# Patient Record
Sex: FEMALE | Race: WHITE | NOT HISPANIC OR LATINO | Employment: PART TIME | URBAN - METROPOLITAN AREA
[De-identification: names, ages, dates, MRNs, and addresses within clinical notes are randomized per-mention and may not be internally consistent; named-entity substitution may affect disease eponyms.]

---

## 2019-07-05 ENCOUNTER — HOSPITAL ENCOUNTER (EMERGENCY)
Facility: HOSPITAL | Age: 59
Discharge: HOME | End: 2019-07-05
Attending: EMERGENCY MEDICINE | Admitting: EMERGENCY MEDICINE
Payer: COMMERCIAL

## 2019-07-05 ENCOUNTER — APPOINTMENT (EMERGENCY)
Dept: CT IMAGING | Facility: HOSPITAL | Age: 59
End: 2019-07-05
Payer: COMMERCIAL

## 2019-07-05 VITALS
DIASTOLIC BLOOD PRESSURE: 80 MMHG | SYSTOLIC BLOOD PRESSURE: 140 MMHG | OXYGEN SATURATION: 99 % | WEIGHT: 193.56 LBS | HEART RATE: 70 BPM | RESPIRATION RATE: 16 BRPM | HEIGHT: 65 IN | BODY MASS INDEX: 32.25 KG/M2

## 2019-07-05 DIAGNOSIS — R73.9 HYPERGLYCEMIA: ICD-10-CM

## 2019-07-05 DIAGNOSIS — N20.1 URETEROLITHIASIS: Primary | ICD-10-CM

## 2019-07-05 LAB
ALBUMIN SERPL BCP-MCNC: 4.2 G/DL (ref 3.5–5)
ALP SERPL-CCNC: 70 U/L (ref 46–116)
ALT SERPL W P-5'-P-CCNC: 47 U/L (ref 12–78)
ANION GAP SERPL CALCULATED.3IONS-SCNC: 13 MMOL/L (ref 4–13)
AST SERPL W P-5'-P-CCNC: 29 U/L (ref 5–45)
BACTERIA UR QL AUTO: ABNORMAL /HPF
BASOPHILS # BLD AUTO: 0.09 THOUSANDS/ΜL (ref 0–0.1)
BASOPHILS NFR BLD AUTO: 1 % (ref 0–1)
BILIRUB SERPL-MCNC: 0.6 MG/DL (ref 0.2–1)
BILIRUB UR QL STRIP: NEGATIVE
BUN SERPL-MCNC: 19 MG/DL (ref 5–25)
CALCIUM SERPL-MCNC: 9.4 MG/DL (ref 8.3–10.1)
CHLORIDE SERPL-SCNC: 101 MMOL/L (ref 100–108)
CLARITY UR: ABNORMAL
CO2 SERPL-SCNC: 25 MMOL/L (ref 21–32)
COLOR UR: YELLOW
CREAT SERPL-MCNC: 1.03 MG/DL (ref 0.6–1.3)
EOSINOPHIL # BLD AUTO: 0.06 THOUSAND/ΜL (ref 0–0.61)
EOSINOPHIL NFR BLD AUTO: 1 % (ref 0–6)
ERYTHROCYTE [DISTWIDTH] IN BLOOD BY AUTOMATED COUNT: 13.2 % (ref 11.6–15.1)
GFR SERPL CREATININE-BSD FRML MDRD: 60 ML/MIN/1.73SQ M
GLUCOSE SERPL-MCNC: 257 MG/DL (ref 65–140)
GLUCOSE UR STRIP-MCNC: ABNORMAL MG/DL
HCT VFR BLD AUTO: 47.6 % (ref 34.8–46.1)
HGB BLD-MCNC: 15.6 G/DL (ref 11.5–15.4)
HGB UR QL STRIP.AUTO: ABNORMAL
IMM GRANULOCYTES # BLD AUTO: 0.06 THOUSAND/UL (ref 0–0.2)
IMM GRANULOCYTES NFR BLD AUTO: 1 % (ref 0–2)
KETONES UR STRIP-MCNC: ABNORMAL MG/DL
LEUKOCYTE ESTERASE UR QL STRIP: NEGATIVE
LIPASE SERPL-CCNC: 203 U/L (ref 73–393)
LYMPHOCYTES # BLD AUTO: 3.49 THOUSANDS/ΜL (ref 0.6–4.47)
LYMPHOCYTES NFR BLD AUTO: 29 % (ref 14–44)
MCH RBC QN AUTO: 30.5 PG (ref 26.8–34.3)
MCHC RBC AUTO-ENTMCNC: 32.8 G/DL (ref 31.4–37.4)
MCV RBC AUTO: 93 FL (ref 82–98)
MONOCYTES # BLD AUTO: 0.79 THOUSAND/ΜL (ref 0.17–1.22)
MONOCYTES NFR BLD AUTO: 7 % (ref 4–12)
NEUTROPHILS # BLD AUTO: 7.56 THOUSANDS/ΜL (ref 1.85–7.62)
NEUTS SEG NFR BLD AUTO: 61 % (ref 43–75)
NITRITE UR QL STRIP: NEGATIVE
NON-SQ EPI CELLS URNS QL MICRO: ABNORMAL /HPF
NRBC BLD AUTO-RTO: 0 /100 WBCS
PH UR STRIP.AUTO: 5.5 [PH]
PLATELET # BLD AUTO: 305 THOUSANDS/UL (ref 149–390)
PMV BLD AUTO: 10.4 FL (ref 8.9–12.7)
POTASSIUM SERPL-SCNC: 4.2 MMOL/L (ref 3.5–5.3)
PROT SERPL-MCNC: 8 G/DL (ref 6.4–8.2)
PROT UR STRIP-MCNC: ABNORMAL MG/DL
RBC # BLD AUTO: 5.11 MILLION/UL (ref 3.81–5.12)
RBC #/AREA URNS AUTO: ABNORMAL /HPF
SODIUM SERPL-SCNC: 139 MMOL/L (ref 136–145)
SP GR UR STRIP.AUTO: >=1.03 (ref 1–1.03)
UROBILINOGEN UR QL STRIP.AUTO: 0.2 E.U./DL
WBC # BLD AUTO: 12.05 THOUSAND/UL (ref 4.31–10.16)
WBC #/AREA URNS AUTO: ABNORMAL /HPF

## 2019-07-05 PROCEDURE — 74176 CT ABD & PELVIS W/O CONTRAST: CPT

## 2019-07-05 PROCEDURE — 96374 THER/PROPH/DIAG INJ IV PUSH: CPT

## 2019-07-05 PROCEDURE — 99284 EMERGENCY DEPT VISIT MOD MDM: CPT

## 2019-07-05 PROCEDURE — 85025 COMPLETE CBC W/AUTO DIFF WBC: CPT | Performed by: EMERGENCY MEDICINE

## 2019-07-05 PROCEDURE — 83690 ASSAY OF LIPASE: CPT | Performed by: EMERGENCY MEDICINE

## 2019-07-05 PROCEDURE — 80053 COMPREHEN METABOLIC PANEL: CPT | Performed by: EMERGENCY MEDICINE

## 2019-07-05 PROCEDURE — 96375 TX/PRO/DX INJ NEW DRUG ADDON: CPT

## 2019-07-05 PROCEDURE — 36415 COLL VENOUS BLD VENIPUNCTURE: CPT | Performed by: EMERGENCY MEDICINE

## 2019-07-05 PROCEDURE — 96376 TX/PRO/DX INJ SAME DRUG ADON: CPT

## 2019-07-05 PROCEDURE — 81001 URINALYSIS AUTO W/SCOPE: CPT | Performed by: EMERGENCY MEDICINE

## 2019-07-05 PROCEDURE — 99284 EMERGENCY DEPT VISIT MOD MDM: CPT | Performed by: EMERGENCY MEDICINE

## 2019-07-05 PROCEDURE — 96361 HYDRATE IV INFUSION ADD-ON: CPT

## 2019-07-05 RX ORDER — KETOROLAC TROMETHAMINE 30 MG/ML
15 INJECTION, SOLUTION INTRAMUSCULAR; INTRAVENOUS ONCE
Status: COMPLETED | OUTPATIENT
Start: 2019-07-05 | End: 2019-07-05

## 2019-07-05 RX ORDER — HYDROMORPHONE HCL/PF 1 MG/ML
0.5 SYRINGE (ML) INJECTION ONCE
Status: COMPLETED | OUTPATIENT
Start: 2019-07-05 | End: 2019-07-05

## 2019-07-05 RX ORDER — OXYCODONE HYDROCHLORIDE AND ACETAMINOPHEN 5; 325 MG/1; MG/1
1 TABLET ORAL EVERY 4 HOURS PRN
Qty: 15 TABLET | Refills: 0 | Status: SHIPPED | OUTPATIENT
Start: 2019-07-05 | End: 2021-04-03

## 2019-07-05 RX ORDER — ONDANSETRON 4 MG/1
4 TABLET, FILM COATED ORAL EVERY 8 HOURS PRN
Qty: 12 TABLET | Refills: 0 | Status: SHIPPED | OUTPATIENT
Start: 2019-07-05 | End: 2021-04-03

## 2019-07-05 RX ORDER — ONDANSETRON 2 MG/ML
4 INJECTION INTRAMUSCULAR; INTRAVENOUS ONCE
Status: COMPLETED | OUTPATIENT
Start: 2019-07-05 | End: 2019-07-05

## 2019-07-05 RX ORDER — IBUPROFEN 600 MG/1
600 TABLET ORAL EVERY 6 HOURS PRN
Qty: 20 TABLET | Refills: 0 | Status: SHIPPED | OUTPATIENT
Start: 2019-07-05

## 2019-07-05 RX ADMIN — SODIUM CHLORIDE 1000 ML: 0.9 INJECTION, SOLUTION INTRAVENOUS at 09:31

## 2019-07-05 RX ADMIN — KETOROLAC TROMETHAMINE 15 MG: 30 INJECTION, SOLUTION INTRAMUSCULAR at 13:05

## 2019-07-05 RX ADMIN — HYDROMORPHONE HYDROCHLORIDE 0.5 MG: 1 INJECTION, SOLUTION INTRAMUSCULAR; INTRAVENOUS; SUBCUTANEOUS at 09:31

## 2019-07-05 RX ADMIN — HYDROMORPHONE HYDROCHLORIDE 0.5 MG: 1 INJECTION, SOLUTION INTRAMUSCULAR; INTRAVENOUS; SUBCUTANEOUS at 10:36

## 2019-07-05 RX ADMIN — ONDANSETRON 4 MG: 2 INJECTION INTRAMUSCULAR; INTRAVENOUS at 09:31

## 2019-07-05 NOTE — ED PROVIDER NOTES
Pt Name: Sherie Iyer  MRN: [id number]  [place] 1960  Age/Sex: 60 y o  female  Date of evaluation: 7/5/2019  PCP: No primary care provider on file  CHIEF COMPLAINT    Chief Complaint   Patient presents with    Flank Pain     Pt presents with c/o L sided flank pain and burning that started this morning  Denies any V/D and any hx of the same symptoms  HPI    60 y o  female presenting with left-sided flank pain  Patient states that she woke with this pain this morning, denies any prior episodes  The pain is severe, burning, in the left flank, constant, worse with lying flat and better sitting up  She also complains of nausea and episode of nonbloody vomiting which she thinks may have been due to pain, also complains of malodorous urine over the past 2-3 days, denies fever, trauma, changes in bowel movements, other symptoms  HPI      Past Medical and Surgical History    History reviewed  No pertinent past medical history  Past Surgical History:   Procedure Laterality Date    TONSILLECTOMY         History reviewed  No pertinent family history  Social History     Tobacco Use    Smoking status: Never Smoker    Smokeless tobacco: Never Used   Substance Use Topics    Alcohol use: Never     Frequency: Never    Drug use: Never           Allergies    No Known Allergies    Home Medications    Prior to Admission medications    Not on File           Review of Systems    Review of Systems   Constitutional: Negative for activity change, chills and fever  HENT: Negative for drooling and facial swelling  Eyes: Negative for pain, discharge and visual disturbance  Respiratory: Negative for apnea, cough, chest tightness, shortness of breath and wheezing  Cardiovascular: Negative for chest pain and leg swelling  Gastrointestinal: Positive for nausea and vomiting  Negative for abdominal pain, constipation and diarrhea  Genitourinary: Positive for dysuria and flank pain   Negative for difficulty urinating and urgency  Musculoskeletal: Negative for arthralgias, back pain and gait problem  Skin: Negative for color change and rash  Neurological: Negative for dizziness, speech difficulty, weakness and headaches  Psychiatric/Behavioral: Negative for agitation, behavioral problems and confusion  All other systems reviewed and negative  Physical Exam      ED Triage Vitals [07/05/19 0904]   Temp Pulse Respirations Blood Pressure SpO2   -- 73 20 144/77 99 %      Temp src Heart Rate Source Patient Position - Orthostatic VS BP Location FiO2 (%)   -- Monitor Sitting Left arm --      Pain Score       Worst Possible Pain               Physical Exam   Constitutional: She is oriented to person, place, and time  She appears well-developed and well-nourished  HENT:   Head: Normocephalic and atraumatic  Nose: Nose normal    Mouth/Throat: Oropharynx is clear and moist    Eyes: Pupils are equal, round, and reactive to light  Conjunctivae and EOM are normal    Neck: Normal range of motion  Neck supple  Cardiovascular: Normal rate, regular rhythm, normal heart sounds and intact distal pulses  Pulmonary/Chest: Effort normal and breath sounds normal  No respiratory distress  She has no wheezes  She has no rales  Abdominal: Soft  She exhibits no distension  There is no tenderness  There is no rebound and no guarding  Left-sided CVA tenderness, no tenderness to palpation of the abdomen   Musculoskeletal: Normal range of motion  She exhibits no edema or deformity  Neurological: She is alert and oriented to person, place, and time  Skin: Skin is warm and dry  No rash noted  No erythema  No rash or skin findings over site of pain or surrounding areas   Psychiatric: She has a normal mood and affect  Her behavior is normal  Judgment and thought content normal    Nursing note and vitals reviewed             Diagnostic Results      Labs:    Results for orders placed or performed during the hospital encounter of 07/05/19   UA w Reflex to Microscopic w Reflex to Culture   Result Value Ref Range    Color, UA Yellow     Clarity, UA Slightly Cloudy     Specific Gravity, UA >=1 030 1 003 - 1 030    pH, UA 5 5 4 5, 5 0, 5 5, 6 0, 6 5, 7 0, 7 5, 8 0    Leukocytes, UA Negative Negative    Nitrite, UA Negative Negative    Protein, UA 30 (1+) (A) Negative mg/dl    Glucose,  (1/4%) (A) Negative mg/dl    Ketones, UA 40 (2+) (A) Negative mg/dl    Urobilinogen, UA 0 2 0 2, 1 0 E U /dl E U /dl    Bilirubin, UA Negative Negative    Blood, UA Large (A) Negative   CBC and differential   Result Value Ref Range    WBC 12 05 (H) 4 31 - 10 16 Thousand/uL    RBC 5 11 3 81 - 5 12 Million/uL    Hemoglobin 15 6 (H) 11 5 - 15 4 g/dL    Hematocrit 47 6 (H) 34 8 - 46 1 %    MCV 93 82 - 98 fL    MCH 30 5 26 8 - 34 3 pg    MCHC 32 8 31 4 - 37 4 g/dL    RDW 13 2 11 6 - 15 1 %    MPV 10 4 8 9 - 12 7 fL    Platelets 315 412 - 359 Thousands/uL    nRBC 0 /100 WBCs    Neutrophils Relative 61 43 - 75 %    Immat GRANS % 1 0 - 2 %    Lymphocytes Relative 29 14 - 44 %    Monocytes Relative 7 4 - 12 %    Eosinophils Relative 1 0 - 6 %    Basophils Relative 1 0 - 1 %    Neutrophils Absolute 7 56 1 85 - 7 62 Thousands/µL    Immature Grans Absolute 0 06 0 00 - 0 20 Thousand/uL    Lymphocytes Absolute 3 49 0 60 - 4 47 Thousands/µL    Monocytes Absolute 0 79 0 17 - 1 22 Thousand/µL    Eosinophils Absolute 0 06 0 00 - 0 61 Thousand/µL    Basophils Absolute 0 09 0 00 - 0 10 Thousands/µL   Comprehensive metabolic panel   Result Value Ref Range    Sodium 139 136 - 145 mmol/L    Potassium 4 2 3 5 - 5 3 mmol/L    Chloride 101 100 - 108 mmol/L    CO2 25 21 - 32 mmol/L    ANION GAP 13 4 - 13 mmol/L    BUN 19 5 - 25 mg/dL    Creatinine 1 03 0 60 - 1 30 mg/dL    Glucose 257 (H) 65 - 140 mg/dL    Calcium 9 4 8 3 - 10 1 mg/dL    AST 29 5 - 45 U/L    ALT 47 12 - 78 U/L    Alkaline Phosphatase 70 46 - 116 U/L    Total Protein 8 0 6 4 - 8 2 g/dL Albumin 4 2 3 5 - 5 0 g/dL    Total Bilirubin 0 60 0 20 - 1 00 mg/dL    eGFR 60 ml/min/1 73sq m   Lipase   Result Value Ref Range    Lipase 203 73 - 393 u/L   Urine Microscopic   Result Value Ref Range    RBC, UA 30-50 (A) None Seen, 0-5 /hpf    WBC, UA 2-4 (A) None Seen, 0-5, 5-55, 5-65 /hpf    Epithelial Cells Occasional None Seen, Occasional /hpf    Bacteria, UA Occasional None Seen, Occasional /hpf       All labs reviewed and utilized in the medical decision making process    Radiology:    CT renal stone study abdomen pelvis without contrast   Final Result   1   3 mm obstructing calculus in the left UVJ with mild fullness of the left renal pelvis and perinephric stranding  2   Cholelithiasis  3   Hepatic steatosis  Workstation performed: [id number]             All radiology studies independently viewed by me and interpreted by the radiologist     Procedure    Procedures        ED Course of Care and Re-Assessments      Initial workup with concern for pyelonephritis, hematuria but no evidence of infection concerning for ureteral stone, CT performed and showed same  Patient counseled on diagnosis, treated symptomatically, discharged strict return precautions, follow up Urology  Medications   sodium chloride 0 9 % bolus 1,000 mL (0 mL Intravenous Stopped 7/5/19 1031)   ondansetron (ZOFRAN) injection 4 mg (4 mg Intravenous Given 7/5/19 0931)   HYDROmorphone (DILAUDID) injection 0 5 mg (0 5 mg Intravenous Given 7/5/19 0931)   HYDROmorphone (DILAUDID) injection 0 5 mg (0 5 mg Intravenous Given 7/5/19 1036)   ketorolac (TORADOL) injection 15 mg (15 mg Intravenous Given 7/5/19 1305)           FINAL IMPRESSION    Final diagnoses:   Ureterolithiasis   Hyperglycemia         DISPOSITION/PLAN    Flank pain most consistent with ureterolithiasis above  Vital signs reassuring, examination concerning for flank pain and acute distress above    Low suspicion for infected stone, sepsis, other acute intra-abdominal process, other alternate diagnosis  Treated symptomatically with substantial improvement, discharged strict return precautions, follow up primary care doctor and Urology  Time reflects when diagnosis was documented in both MDM as applicable and the Disposition within this note     Time User Action Codes Description Comment    7/5/2019 12:58 PM Akbar Neely Add [N20 1] Ureterolithiasis     7/5/2019  1:00 PM Ashly WOODWARD Add [R73 9] Hyperglycemia       ED Disposition     ED Disposition Condition Date/Time Comment    Discharge Stable Fri Jul 5, 2019 12:58 PM Mcadams Pica discharge to home/self care              Follow-up Information     Follow up With Specialties Details Why Contact Info Additional [address] [facility] Urology Kev [facility] Urology Call today To schedule close outpatient followup [address]  [facility] [address] [phone] [facility] Kev Perkins, [address],  [address], [place], [place], [address]    Your primary care doctor  Call today To discuss this visit              PATIENT REFERRED TO:    [facility] Kev Perkins  [address]  [address]  [address]  [phone]  Call today  To schedule close outpatient followup    Your primary care doctor    Call today  To discuss this visit      DISCHARGE MEDICATIONS:    Discharge Medication List as of 7/5/2019  1:00 PM      START taking these medications    Details   ibuprofen (MOTRIN) 600 mg tablet Take 1 tablet (600 mg total) by mouth every 6 (six) hours as needed for moderate pain, Starting Fri 7/5/2019, Print      ondansetron (ZOFRAN) 4 mg tablet Take 1 tablet (4 mg total) by mouth every 8 (eight) hours as needed for nausea or vomiting, Starting Fri 7/5/2019, Print      oxyCODONE-acetaminophen (PERCOCET) 5-325 mg per tablet Take 1 tablet by mouth every 4 (four) hours as needed for moderate pain for up to 15 dosesMax Daily Amount: 6 tablets, Starting Fri 7/5/2019, Print             No discharge procedures on file           MD Altagracia Waller MD  07/05/19 3798

## 2021-03-29 ENCOUNTER — HOSPITAL ENCOUNTER (EMERGENCY)
Facility: HOSPITAL | Age: 61
Discharge: HOME | End: 2021-03-29
Attending: EMERGENCY MEDICINE | Admitting: EMERGENCY MEDICINE
Payer: COMMERCIAL

## 2021-03-29 ENCOUNTER — APPOINTMENT (EMERGENCY)
Dept: CT IMAGING | Facility: HOSPITAL | Age: 61
End: 2021-03-29
Payer: COMMERCIAL

## 2021-03-29 ENCOUNTER — APPOINTMENT (EMERGENCY)
Dept: RADIOLOGY | Facility: HOSPITAL | Age: 61
End: 2021-03-29
Payer: COMMERCIAL

## 2021-03-29 VITALS
BODY MASS INDEX: 29.82 KG/M2 | OXYGEN SATURATION: 94 % | HEART RATE: 79 BPM | WEIGHT: 179 LBS | SYSTOLIC BLOOD PRESSURE: 127 MMHG | HEIGHT: 65 IN | RESPIRATION RATE: 19 BRPM | DIASTOLIC BLOOD PRESSURE: 60 MMHG | TEMPERATURE: 100.4 F

## 2021-03-29 DIAGNOSIS — N39.0 UTI (URINARY TRACT INFECTION): ICD-10-CM

## 2021-03-29 DIAGNOSIS — U07.1 PNEUMONIA DUE TO COVID-19 VIRUS: Primary | ICD-10-CM

## 2021-03-29 DIAGNOSIS — J12.82 PNEUMONIA DUE TO COVID-19 VIRUS: Primary | ICD-10-CM

## 2021-03-29 LAB
ALBUMIN SERPL BCP-MCNC: 3.3 G/DL (ref 3.5–5)
ALP SERPL-CCNC: 63 U/L (ref 46–116)
ALT SERPL W P-5'-P-CCNC: 57 U/L (ref 12–78)
ANION GAP SERPL CALCULATED.3IONS-SCNC: 13 MMOL/L (ref 4–13)
AST SERPL W P-5'-P-CCNC: 46 U/L (ref 5–45)
BACTERIA UR QL AUTO: ABNORMAL /HPF
BASOPHILS # BLD AUTO: 0.01 THOUSANDS/ΜL (ref 0–0.1)
BASOPHILS NFR BLD AUTO: 0 % (ref 0–1)
BILIRUB SERPL-MCNC: 0.38 MG/DL (ref 0.2–1)
BILIRUB UR QL STRIP: ABNORMAL
BUN SERPL-MCNC: 14 MG/DL (ref 5–25)
CALCIUM ALBUM COR SERPL-MCNC: 8.9 MG/DL (ref 8.3–10.1)
CALCIUM SERPL-MCNC: 8.3 MG/DL (ref 8.3–10.1)
CHLORIDE SERPL-SCNC: 101 MMOL/L (ref 100–108)
CLARITY UR: CLEAR
CO2 SERPL-SCNC: 24 MMOL/L (ref 21–32)
COLOR UR: YELLOW
CREAT SERPL-MCNC: 0.86 MG/DL (ref 0.6–1.3)
EOSINOPHIL # BLD AUTO: 0 THOUSAND/ΜL (ref 0–0.61)
EOSINOPHIL NFR BLD AUTO: 0 % (ref 0–6)
ERYTHROCYTE [DISTWIDTH] IN BLOOD BY AUTOMATED COUNT: 13.1 % (ref 11.6–15.1)
GFR SERPL CREATININE-BSD FRML MDRD: 73 ML/MIN/1.73SQ M
GLUCOSE SERPL-MCNC: 242 MG/DL (ref 65–140)
GLUCOSE UR STRIP-MCNC: ABNORMAL MG/DL
HCT VFR BLD AUTO: 49.5 % (ref 34.8–46.1)
HGB BLD-MCNC: 16.2 G/DL (ref 11.5–15.4)
HGB UR QL STRIP.AUTO: ABNORMAL
IMM GRANULOCYTES # BLD AUTO: 0.03 THOUSAND/UL (ref 0–0.2)
IMM GRANULOCYTES NFR BLD AUTO: 1 % (ref 0–2)
KETONES UR STRIP-MCNC: ABNORMAL MG/DL
LEUKOCYTE ESTERASE UR QL STRIP: NEGATIVE
LIPASE SERPL-CCNC: 292 U/L (ref 73–393)
LYMPHOCYTES # BLD AUTO: 1.69 THOUSANDS/ΜL (ref 0.6–4.47)
LYMPHOCYTES NFR BLD AUTO: 30 % (ref 14–44)
MCH RBC QN AUTO: 30.3 PG (ref 26.8–34.3)
MCHC RBC AUTO-ENTMCNC: 32.7 G/DL (ref 31.4–37.4)
MCV RBC AUTO: 93 FL (ref 82–98)
MONOCYTES # BLD AUTO: 0.65 THOUSAND/ΜL (ref 0.17–1.22)
MONOCYTES NFR BLD AUTO: 12 % (ref 4–12)
MUCOUS THREADS UR QL AUTO: ABNORMAL
NEUTROPHILS # BLD AUTO: 3.22 THOUSANDS/ΜL (ref 1.85–7.62)
NEUTS SEG NFR BLD AUTO: 57 % (ref 43–75)
NITRITE UR QL STRIP: NEGATIVE
NON-SQ EPI CELLS URNS QL MICRO: ABNORMAL /HPF
NRBC BLD AUTO-RTO: 0 /100 WBCS
PH UR STRIP.AUTO: 6 [PH]
PLATELET # BLD AUTO: 147 THOUSANDS/UL (ref 149–390)
PMV BLD AUTO: 11 FL (ref 8.9–12.7)
POTASSIUM SERPL-SCNC: 3.8 MMOL/L (ref 3.5–5.3)
PROT SERPL-MCNC: 7.6 G/DL (ref 6.4–8.2)
PROT UR STRIP-MCNC: ABNORMAL MG/DL
RBC # BLD AUTO: 5.34 MILLION/UL (ref 3.81–5.12)
RBC #/AREA URNS AUTO: ABNORMAL /HPF
SARS-COV-2 RNA RESP QL NAA+PROBE: POSITIVE
SODIUM SERPL-SCNC: 138 MMOL/L (ref 136–145)
SP GR UR STRIP.AUTO: >=1.03 (ref 1–1.03)
UROBILINOGEN UR QL STRIP.AUTO: 0.2 E.U./DL
WBC # BLD AUTO: 5.6 THOUSAND/UL (ref 4.31–10.16)
WBC #/AREA URNS AUTO: ABNORMAL /HPF

## 2021-03-29 PROCEDURE — 87635 SARS-COV-2 COVID-19 AMP PRB: CPT | Performed by: EMERGENCY MEDICINE

## 2021-03-29 PROCEDURE — 71046 X-RAY EXAM CHEST 2 VIEWS: CPT

## 2021-03-29 PROCEDURE — 74176 CT ABD & PELVIS W/O CONTRAST: CPT

## 2021-03-29 PROCEDURE — 99284 EMERGENCY DEPT VISIT MOD MDM: CPT | Performed by: EMERGENCY MEDICINE

## 2021-03-29 PROCEDURE — 81001 URINALYSIS AUTO W/SCOPE: CPT | Performed by: EMERGENCY MEDICINE

## 2021-03-29 PROCEDURE — 80053 COMPREHEN METABOLIC PANEL: CPT | Performed by: EMERGENCY MEDICINE

## 2021-03-29 PROCEDURE — 36415 COLL VENOUS BLD VENIPUNCTURE: CPT

## 2021-03-29 PROCEDURE — 99284 EMERGENCY DEPT VISIT MOD MDM: CPT

## 2021-03-29 PROCEDURE — 83690 ASSAY OF LIPASE: CPT | Performed by: EMERGENCY MEDICINE

## 2021-03-29 PROCEDURE — 96372 THER/PROPH/DIAG INJ SC/IM: CPT

## 2021-03-29 PROCEDURE — G1004 CDSM NDSC: HCPCS

## 2021-03-29 PROCEDURE — 85025 COMPLETE CBC W/AUTO DIFF WBC: CPT | Performed by: EMERGENCY MEDICINE

## 2021-03-29 RX ORDER — ONDANSETRON 4 MG/1
4 TABLET, ORALLY DISINTEGRATING ORAL ONCE
Status: COMPLETED | OUTPATIENT
Start: 2021-03-29 | End: 2021-03-29

## 2021-03-29 RX ORDER — ONDANSETRON 4 MG/1
4 TABLET, FILM COATED ORAL EVERY 6 HOURS
Qty: 12 TABLET | Refills: 0 | Status: SHIPPED | OUTPATIENT
Start: 2021-03-29

## 2021-03-29 RX ORDER — CEPHALEXIN 250 MG/1
500 CAPSULE ORAL ONCE
Status: COMPLETED | OUTPATIENT
Start: 2021-03-29 | End: 2021-03-29

## 2021-03-29 RX ORDER — FOLIC ACID/MULTIVIT,IRON,MINER .4-18-35
1 TABLET,CHEWABLE ORAL DAILY
Qty: 7 TABLET | Refills: 0 | Status: SHIPPED | OUTPATIENT
Start: 2021-03-29 | End: 2021-04-05

## 2021-03-29 RX ORDER — MULTIVIT WITH MINERALS/LUTEIN
1000 TABLET ORAL 2 TIMES DAILY
Qty: 14 TABLET | Refills: 0 | Status: SHIPPED | OUTPATIENT
Start: 2021-03-29 | End: 2021-04-05

## 2021-03-29 RX ORDER — KETOROLAC TROMETHAMINE 30 MG/ML
30 INJECTION, SOLUTION INTRAMUSCULAR; INTRAVENOUS ONCE
Status: COMPLETED | OUTPATIENT
Start: 2021-03-29 | End: 2021-03-29

## 2021-03-29 RX ORDER — CEPHALEXIN 250 MG/1
500 CAPSULE ORAL EVERY 6 HOURS SCHEDULED
Qty: 56 CAPSULE | Refills: 0 | Status: SHIPPED | OUTPATIENT
Start: 2021-03-29 | End: 2021-04-12 | Stop reason: HOSPADM

## 2021-03-29 RX ORDER — MELATONIN
2000 DAILY
Qty: 14 TABLET | Refills: 0 | Status: SHIPPED | OUTPATIENT
Start: 2021-03-29 | End: 2021-04-05

## 2021-03-29 RX ADMIN — KETOROLAC TROMETHAMINE 30 MG: 30 INJECTION, SOLUTION INTRAMUSCULAR at 16:00

## 2021-03-29 RX ADMIN — CEPHALEXIN 500 MG: 250 CAPSULE ORAL at 17:39

## 2021-03-29 RX ADMIN — ONDANSETRON 4 MG: 4 TABLET, ORALLY DISINTEGRATING ORAL at 16:00

## 2021-03-29 NOTE — ED PROVIDER NOTES
Pt Name: Mulu Thibodeaux  MRN: [id number]  [place] 1960  Age/Sex: 63 y o  female  Date of evaluation: 3/29/2021  PCP: No primary care provider on file  CHIEF COMPLAINT    Chief Complaint   Patient presents with    Vomiting      COVID positive, pt tested a few days ago and was told she was negative  Pt reports fever, cough, diarrhea and vomiting x1 week   Fever - 9 weeks to 76 years         HPI    63 y o  female presenting with 1 week of fever, cough, diarrhea, nausea, occasional vomiting  Patient states the symptoms been going on over the past week, growing gradually worse  She is still able to keep down fluids but has had poor appetite overall  She states her  is COVID positive and has similar symptoms, she had test several days ago which was negative but she is concern for a false negative  She also complains of several days of hematuria and dysuria  She notes that she has a history of kidney stones, is not sure if this is a recurrent kidney stone  She denies chest pain, trauma, other symptoms  HPI      Past Medical and Surgical History    History reviewed  No pertinent past medical history  Past Surgical History:   Procedure Laterality Date    TONSILLECTOMY         History reviewed  No pertinent family history  Social History     Tobacco Use    Smoking status: Never Smoker    Smokeless tobacco: Never Used   Substance Use Topics    Alcohol use: Never     Frequency: Never    Drug use: Never           Allergies    No Known Allergies    Home Medications    Prior to Admission medications    Medication Sig Start Date End Date Taking?  Authorizing Provider   ibuprofen (MOTRIN) 600 mg tablet Take 1 tablet (600 mg total) by mouth every 6 (six) hours as needed for moderate pain 7/5/19   Abimael Grimes MD   ondansetron [facility]) 4 mg tablet Take 1 tablet (4 mg total) by mouth every 8 (eight) hours as needed for nausea or vomiting 7/5/19   Abimael Grimes MD oxyCODONE-acetaminophen (PERCOCET) 5-325 mg per tablet Take 1 tablet by mouth every 4 (four) hours as needed for moderate pain for up to 15 dosesMax Daily Amount: 6 tablets 7/5/19   Niru Leyva MD           Review of Systems    Review of Systems   Constitutional: Positive for fever  Negative for activity change and chills  HENT: Negative for drooling and facial swelling  Eyes: Negative for pain, discharge and visual disturbance  Respiratory: Positive for cough  Negative for apnea, chest tightness, shortness of breath and wheezing  Cardiovascular: Negative for chest pain and leg swelling  Gastrointestinal: Positive for diarrhea, nausea and vomiting  Negative for abdominal pain and constipation  Genitourinary: Positive for dysuria and hematuria  Negative for difficulty urinating and urgency  Musculoskeletal: Negative for arthralgias, back pain and gait problem  Skin: Negative for color change and rash  Neurological: Negative for dizziness, speech difficulty, weakness and headaches  Psychiatric/Behavioral: Negative for agitation, behavioral problems and confusion  All other systems reviewed and negative  Physical Exam      ED Triage Vitals [03/29/21 1134]   Temperature Pulse Respirations Blood Pressure SpO2   100 4 °F (38 °C) 83 18 138/63 93 %      Temp Source Heart Rate Source Patient Position - Orthostatic VS BP Location FiO2 (%)   Oral Monitor Sitting Left arm --      Pain Score       Worst Possible Pain               Physical Exam  Vitals signs and nursing note reviewed  Constitutional:       General: She is not in acute distress  Appearance: Normal appearance  She is well-developed  She is not ill-appearing  HENT:      Head: Normocephalic and atraumatic  Eyes:      Conjunctiva/sclera: Conjunctivae normal       Pupils: Pupils are equal, round, and reactive to light  Neck:      Musculoskeletal: Normal range of motion and neck supple     Cardiovascular: Rate and Rhythm: Normal rate and regular rhythm  Heart sounds: Normal heart sounds  Pulmonary:      Effort: Pulmonary effort is normal  No respiratory distress  Breath sounds: Normal breath sounds  No wheezing or rales  Abdominal:      General: There is no distension  Palpations: Abdomen is soft  Tenderness: There is no abdominal tenderness  There is no guarding or rebound  Musculoskeletal: Normal range of motion  General: No deformity  Skin:     General: Skin is warm and dry  Findings: No erythema or rash  Neurological:      Mental Status: She is alert and oriented to person, place, and time  Psychiatric:         Behavior: Behavior normal          Thought Content: Thought content normal          Judgment: Judgment normal               Diagnostic Results      Labs:    Results Reviewed     Procedure Component Value Units Date/Time    Urine Microscopic [[id number]]  (Abnormal) Collected: 03/29/21 1706    Lab Status: Final result Specimen: Urine, Clean Catch Updated: 03/29/21 1727     RBC, UA 2-4 /hpf      WBC, UA 2-4 /hpf      Epithelial Cells Moderate /hpf      Bacteria, UA Moderate /hpf      MUCUS THREADS Occasional    UA w Reflex to Microscopic w Reflex to Culture [[id number]]  (Abnormal) Collected: 03/29/21 1706    Lab Status: Final result Specimen: Urine, Clean Catch Updated: 03/29/21 1713     Color, UA Yellow     Clarity, UA Clear     Specific Gravity, UA >=1 030     pH, UA 6 0     Leukocytes, UA Negative     Nitrite, UA Negative     Protein, UA 30 (1+) mg/dl      Glucose,  (1/4%) mg/dl      Ketones, UA 40 (2+) mg/dl      Urobilinogen, UA 0 2 E U /dl      Bilirubin, UA Small     Blood, UA Moderate    Novel Coronavirus Jami Wang [facility] [[id number]] Collected: 03/29/21 1600    Lab Status:  In process Specimen: Nares from Nasopharyngeal Swab Updated: 03/29/21 1605    CBC and differential [[id number]]  (Abnormal) Collected: 03/29/21 1215    Lab Status: Final result Specimen: Blood from Arm, Left Updated: 03/29/21 1239     WBC 5 60 Thousand/uL      RBC 5 34 Million/uL      Hemoglobin 16 2 g/dL      Hematocrit 49 5 %      MCV 93 fL      MCH 30 3 pg      MCHC 32 7 g/dL      RDW 13 1 %      MPV 11 0 fL      Platelets 702 Thousands/uL      nRBC 0 /100 WBCs      Neutrophils Relative 57 %      Immat GRANS % 1 %      Lymphocytes Relative 30 %      Monocytes Relative 12 %      Eosinophils Relative 0 %      Basophils Relative 0 %      Neutrophils Absolute 3 22 Thousands/µL      Immature Grans Absolute 0 03 Thousand/uL      Lymphocytes Absolute 1 69 Thousands/µL      Monocytes Absolute 0 65 Thousand/µL      Eosinophils Absolute 0 00 Thousand/µL      Basophils Absolute 0 01 Thousands/µL     Comprehensive metabolic panel [[id number]]  (Abnormal) Collected: 03/29/21 1215    Lab Status: Final result Specimen: Blood from Arm, Left Updated: 03/29/21 1236     Sodium 138 mmol/L      Potassium 3 8 mmol/L      Chloride 101 mmol/L      CO2 24 mmol/L      ANION GAP 13 mmol/L      BUN 14 mg/dL      Creatinine 0 86 mg/dL      Glucose 242 mg/dL      Calcium 8 3 mg/dL      Corrected Calcium 8 9 mg/dL      AST 46 U/L      ALT 57 U/L      Alkaline Phosphatase 63 U/L      Total Protein 7 6 g/dL      Albumin 3 3 g/dL      Total Bilirubin 0 38 mg/dL      eGFR 73 ml/min/1 73sq m     Narrative:      [place] guidelines for Chronic Kidney Disease (CKD):     Stage 1 with normal or high GFR (GFR > 90 mL/min/1 73 square meters)    Stage 2 Mild CKD (GFR = 60-89 mL/min/1 73 square meters)    Stage 3A Moderate CKD (GFR = 45-59 mL/min/1 73 square meters)    Stage 3B Moderate CKD (GFR = 30-44 mL/min/1 73 square meters)    Stage 4 Severe CKD (GFR = 15-29 mL/min/1 73 square meters)    Stage 5 End Stage CKD (GFR <15 mL/min/1 73 square meters)  Note: GFR calculation is accurate only with a steady state creatinine    Lipase [[id number]]  (Normal) Collected: 03/29/21 1215    Lab Status: Final result Specimen: Blood from Arm, Left Updated: 03/29/21 1236     Lipase 292 u/L           All labs reviewed and utilized in the medical decision making process    Radiology:    CT renal stone study abdomen pelvis wo contrast   Final Result      No evidence for obstructive uropathy  Cholelithiasis without CT evidence for acute cholecystitis  Partially imaged groundglass opacities within the visualized lung fields correlated with recent chest x-ray  Workstation performed: [id number]         XR chest 2 views   Final Result      Peripheral, rounded groundglass opacity in the right lung and possible left lower lobe  In the setting of clinically suspected/proven COVID-19, this plain film appearance while nonspecific, can be seen in cases of viral pneumonia such as COVID-19  The study was marked in [facility] for immediate notification                              Workstation performed: [id number]             All radiology studies independently viewed by me and interpreted by the radiologist     Procedure    Procedures        ED Course of Care and Re-Assessments      Symptoms much improved with treatment as below  Patient ambulated with pulse ox not dropping below 96 % at any point with ambulation  Medications   ketorolac (TORADOL) injection 30 mg (30 mg Intramuscular Given 3/29/21 1600)   ondansetron (ZOFRAN-ODT) dispersible tablet 4 mg (4 mg Oral Given 3/29/21 1600)   cephalexin (KEFLEX) capsule 500 mg (500 mg Oral Given 3/29/21 1739)           FINAL IMPRESSION    Final diagnoses:   Pneumonia due to COVID-19 virus   UTI (urinary tract infection)         DISPOSITION/PLAN    Presentation as above most consistent with pneumonia due to COVID-19 virus as well as concurrent urinary tract infection  Vital signs reassuring, examination nonspecific  Symptoms consistent with COVID-19 infection but patient doing well with no significant hypoxemia noted    Laboratory workup yielded abnormalities as above, consistent with dehydration as well as urinary tract infection but no critical metabolic derangements noted  After symptomatic treatment, improved in ER, able to tolerate oral fluids without difficulty  Started on Keflex for urinary tract infection  Discharged strict return precautions, follow up primary care doctor  Also counseled to return for any worsening symptoms particularly shortness of breath or dropping pulse ox  Time reflects when diagnosis was documented in both MDM as applicable and the Disposition within this note     Time User Action Codes Description Comment    3/29/2021  5:37 PM Alejandro Castro Add [U07 1,  J12 82] Pneumonia due to COVID-19 virus     3/29/2021  5:37 PM Raul WOODWARD Add [N39 0] UTI (urinary tract infection)       ED Disposition     ED Disposition Condition Date/Time Comment    Discharge Stable Mon Mar 29, 2021  5:37 PM Orin Bosworth discharge to home/self care              Follow-up Information     Follow up With Specialties Details Why Contact Info Additional [address] [facility] Emergency Department Emergency Medicine Go to  If symptoms worsen [facility] [address] [place] [address] [address] [facility] Emergency Department, [address] [place], [place], [address]    Your primary care doctor   Discuss this visit and schedule close outpatient follow-up      [facility] Urology Call in 1 day To schedule follow-up for your hematuria [address]  [address] [address]  701  [facility] For Urology [facility], [address] [facility]  [address] [facility], [facility] [address], [place], [place], [address]            PATIENT REFERRED TO:    [facility] Emergency Department  [address] [place] [address] [phone]  Go to   If symptoms worsen    Your primary care doctor      Discuss this visit and schedule close outpatient follow-up    [facility] Urology [address]  [address] [facility]  [phone]  Call in 1 day  To schedule follow-up for your hematuria      DISCHARGE MEDICATIONS:    Patient's Medications   Discharge Prescriptions    ASCORBIC ACID (VITAMIN C) 1000 MG TABLET    Take 1 tablet (1,000 mg total) by mouth 2 (two) times a day for 7 days       Start Date: 3/29/2021 End Date: 4/5/2021       Order Dose: 1,000 mg       Quantity: 14 tablet    Refills: 0    CEPHALEXIN (KEFLEX) 250 MG CAPSULE    Take 2 capsules (500 mg total) by mouth every 6 (six) hours for 7 days       Start Date: 3/29/2021 End Date: 4/5/2021       Order Dose: 500 mg       Quantity: 56 capsule    Refills: 0    CHOLECALCIFEROL (VITAMIN D3) 1,000 UNITS TABLET    Take 2 tablets (2,000 Units total) by mouth daily for 7 days       Start Date: 3/29/2021 End Date: 4/5/2021       Order Dose: 2,000 Units       Quantity: 14 tablet    Refills: 0    MISC  DEVICES (PULSE OXIMETER) MISC    Please check your oxygen saturation every 4 hours while awake or if you experience shortness of breath       Start Date: 3/29/2021 End Date: --       Order Dose: --       Quantity: 1 each    Refills: 0    MULTIVITAMIN-IRON-MINERALS-FOLIC ACID (CENTRUM) CHEWABLE TABLET    Chew 1 tablet daily for 7 days       Start Date: 3/29/2021 End Date: 4/5/2021       Order Dose: 1 tablet       Quantity: 7 tablet    Refills: 0    ONDANSETRON (ZOFRAN) 4 MG TABLET    Take 1 tablet (4 mg total) by mouth every 6 (six) hours       Start Date: 3/29/2021 End Date: --       Order Dose: 4 mg       Quantity: 12 tablet    Refills: 0       No discharge procedures on file           MD Dane Kwok MD  03/29/21 4521       Dane Martin MD  03/29/21 0284

## 2021-04-03 ENCOUNTER — APPOINTMENT (EMERGENCY)
Dept: RADIOLOGY | Facility: HOSPITAL | Age: 61
DRG: 871 | End: 2021-04-03
Payer: COMMERCIAL

## 2021-04-03 ENCOUNTER — HOSPITAL ENCOUNTER (INPATIENT)
Facility: HOSPITAL | Age: 61
LOS: 9 days | Discharge: HOME | DRG: 871 | End: 2021-04-12
Attending: EMERGENCY MEDICINE | Admitting: INTERNAL MEDICINE
Payer: COMMERCIAL

## 2021-04-03 DIAGNOSIS — U07.1 PNEUMONIA DUE TO COVID-19 VIRUS: ICD-10-CM

## 2021-04-03 DIAGNOSIS — J12.82 PNEUMONIA DUE TO COVID-19 VIRUS: ICD-10-CM

## 2021-04-03 DIAGNOSIS — R09.02 HYPOXIA: ICD-10-CM

## 2021-04-03 DIAGNOSIS — U07.1 COVID-19 VIRUS INFECTION: ICD-10-CM

## 2021-04-03 DIAGNOSIS — J96.01 ACUTE RESPIRATORY FAILURE WITH HYPOXIA (HCC): ICD-10-CM

## 2021-04-03 DIAGNOSIS — R53.1 WEAKNESS: Primary | ICD-10-CM

## 2021-04-03 PROBLEM — A41.9 SEPSIS (HCC): Status: ACTIVE | Noted: 2021-04-03

## 2021-04-03 PROBLEM — J96.00 ACUTE RESPIRATORY FAILURE (HCC): Status: ACTIVE | Noted: 2021-04-03

## 2021-04-03 PROBLEM — R10.9 ABDOMINAL PAIN: Status: ACTIVE | Noted: 2021-04-03

## 2021-04-03 LAB
ABO GROUP BLD: NORMAL
ALBUMIN SERPL BCP-MCNC: 2.7 G/DL (ref 3.5–5)
ALP SERPL-CCNC: 63 U/L (ref 46–116)
ALT SERPL W P-5'-P-CCNC: 30 U/L (ref 12–78)
ANION GAP SERPL CALCULATED.3IONS-SCNC: 12 MMOL/L (ref 4–13)
AST SERPL W P-5'-P-CCNC: 29 U/L (ref 5–45)
BASOPHILS # BLD MANUAL: 0 THOUSAND/UL (ref 0–0.1)
BASOPHILS NFR MAR MANUAL: 0 % (ref 0–1)
BILIRUB DIRECT SERPL-MCNC: 0.13 MG/DL (ref 0–0.2)
BILIRUB SERPL-MCNC: 0.39 MG/DL (ref 0.2–1)
BUN SERPL-MCNC: 9 MG/DL (ref 5–25)
CALCIUM SERPL-MCNC: 8.8 MG/DL (ref 8.3–10.1)
CHLORIDE SERPL-SCNC: 100 MMOL/L (ref 100–108)
CO2 SERPL-SCNC: 26 MMOL/L (ref 21–32)
CREAT SERPL-MCNC: 0.86 MG/DL (ref 0.6–1.3)
D DIMER PPP FEU-MCNC: 0.85 UG/ML FEU
EOSINOPHIL # BLD MANUAL: 0 THOUSAND/UL (ref 0–0.4)
EOSINOPHIL NFR BLD MANUAL: 0 % (ref 0–6)
ERYTHROCYTE [DISTWIDTH] IN BLOOD BY AUTOMATED COUNT: 12.7 % (ref 11.6–15.1)
GFR SERPL CREATININE-BSD FRML MDRD: 73 ML/MIN/1.73SQ M
GLUCOSE SERPL-MCNC: 247 MG/DL (ref 65–140)
GLUCOSE SERPL-MCNC: 274 MG/DL (ref 65–140)
HCT VFR BLD AUTO: 47 % (ref 34.8–46.1)
HGB BLD-MCNC: 15.7 G/DL (ref 11.5–15.4)
LACTATE SERPL-SCNC: 1.8 MMOL/L (ref 0.5–2)
LYMPHOCYTES # BLD AUTO: 1.62 THOUSAND/UL (ref 0.6–4.47)
LYMPHOCYTES # BLD AUTO: 15 % (ref 14–44)
MCH RBC QN AUTO: 29.8 PG (ref 26.8–34.3)
MCHC RBC AUTO-ENTMCNC: 33.4 G/DL (ref 31.4–37.4)
MCV RBC AUTO: 89 FL (ref 82–98)
MONOCYTES # BLD AUTO: 0.22 THOUSAND/UL (ref 0–1.22)
MONOCYTES NFR BLD: 2 % (ref 4–12)
NEUTROPHILS # BLD MANUAL: 8.85 THOUSAND/UL (ref 1.85–7.62)
NEUTS BAND NFR BLD MANUAL: 4 % (ref 0–8)
NEUTS SEG NFR BLD AUTO: 78 % (ref 43–75)
NRBC BLD AUTO-RTO: 0 /100 WBCS
PLATELET # BLD AUTO: 271 THOUSANDS/UL (ref 149–390)
PLATELET BLD QL SMEAR: ADEQUATE
PMV BLD AUTO: 10.4 FL (ref 8.9–12.7)
POTASSIUM SERPL-SCNC: 3.2 MMOL/L (ref 3.5–5.3)
PROCALCITONIN SERPL-MCNC: 2.56 NG/ML
PROT SERPL-MCNC: 7.1 G/DL (ref 6.4–8.2)
RBC # BLD AUTO: 5.26 MILLION/UL (ref 3.81–5.12)
RH BLD: POSITIVE
SODIUM SERPL-SCNC: 138 MMOL/L (ref 136–145)
TOTAL CELLS COUNTED SPEC: 100
TROPONIN I SERPL-MCNC: <0.02 NG/ML
VARIANT LYMPHS # BLD AUTO: 1 %
WBC # BLD AUTO: 10.79 THOUSAND/UL (ref 4.31–10.16)

## 2021-04-03 PROCEDURE — 36415 COLL VENOUS BLD VENIPUNCTURE: CPT | Performed by: PHYSICIAN ASSISTANT

## 2021-04-03 PROCEDURE — 83605 ASSAY OF LACTIC ACID: CPT | Performed by: PHYSICIAN ASSISTANT

## 2021-04-03 PROCEDURE — 85027 COMPLETE CBC AUTOMATED: CPT | Performed by: PHYSICIAN ASSISTANT

## 2021-04-03 PROCEDURE — 82948 REAGENT STRIP/BLOOD GLUCOSE: CPT

## 2021-04-03 PROCEDURE — 85379 FIBRIN DEGRADATION QUANT: CPT | Performed by: PHYSICIAN ASSISTANT

## 2021-04-03 PROCEDURE — 87493 C DIFF AMPLIFIED PROBE: CPT | Performed by: INTERNAL MEDICINE

## 2021-04-03 PROCEDURE — 84145 PROCALCITONIN (PCT): CPT | Performed by: PHYSICIAN ASSISTANT

## 2021-04-03 PROCEDURE — 84484 ASSAY OF TROPONIN QUANT: CPT | Performed by: PHYSICIAN ASSISTANT

## 2021-04-03 PROCEDURE — 99282 EMERGENCY DEPT VISIT SF MDM: CPT | Performed by: INTERNAL MEDICINE

## 2021-04-03 PROCEDURE — 99223 1ST HOSP IP/OBS HIGH 75: CPT | Performed by: INTERNAL MEDICINE

## 2021-04-03 PROCEDURE — 85007 BL SMEAR W/DIFF WBC COUNT: CPT | Performed by: PHYSICIAN ASSISTANT

## 2021-04-03 PROCEDURE — 80076 HEPATIC FUNCTION PANEL: CPT | Performed by: PHYSICIAN ASSISTANT

## 2021-04-03 PROCEDURE — 99285 EMERGENCY DEPT VISIT HI MDM: CPT | Performed by: PHYSICIAN ASSISTANT

## 2021-04-03 PROCEDURE — 94760 N-INVAS EAR/PLS OXIMETRY 1: CPT

## 2021-04-03 PROCEDURE — XW033E5 INTRODUCTION OF REMDESIVIR ANTI-INFECTIVE INTO PERIPHERAL VEIN, PERCUTANEOUS APPROACH, NEW TECHNOLOGY GROUP 5: ICD-10-PCS | Performed by: STUDENT IN AN ORGANIZED HEALTH CARE EDUCATION/TRAINING PROGRAM

## 2021-04-03 PROCEDURE — 80048 BASIC METABOLIC PNL TOTAL CA: CPT | Performed by: PHYSICIAN ASSISTANT

## 2021-04-03 PROCEDURE — 71045 X-RAY EXAM CHEST 1 VIEW: CPT

## 2021-04-03 PROCEDURE — 93005 ELECTROCARDIOGRAM TRACING: CPT

## 2021-04-03 PROCEDURE — 86901 BLOOD TYPING SEROLOGIC RH(D): CPT | Performed by: PHYSICIAN ASSISTANT

## 2021-04-03 PROCEDURE — 96375 TX/PRO/DX INJ NEW DRUG ADDON: CPT

## 2021-04-03 PROCEDURE — 86900 BLOOD TYPING SEROLOGIC ABO: CPT | Performed by: PHYSICIAN ASSISTANT

## 2021-04-03 PROCEDURE — 99285 EMERGENCY DEPT VISIT HI MDM: CPT

## 2021-04-03 PROCEDURE — 87040 BLOOD CULTURE FOR BACTERIA: CPT | Performed by: PHYSICIAN ASSISTANT

## 2021-04-03 PROCEDURE — 96365 THER/PROPH/DIAG IV INF INIT: CPT

## 2021-04-03 RX ORDER — KETOROLAC TROMETHAMINE 30 MG/ML
30 INJECTION, SOLUTION INTRAMUSCULAR; INTRAVENOUS ONCE
Status: COMPLETED | OUTPATIENT
Start: 2021-04-03 | End: 2021-04-03

## 2021-04-03 RX ORDER — GUAIFENESIN 600 MG
600 TABLET, EXTENDED RELEASE 12 HR ORAL EVERY 12 HOURS SCHEDULED
Status: DISCONTINUED | OUTPATIENT
Start: 2021-04-03 | End: 2021-04-12 | Stop reason: HOSPADM

## 2021-04-03 RX ORDER — METOCLOPRAMIDE HYDROCHLORIDE 5 MG/ML
10 INJECTION INTRAMUSCULAR; INTRAVENOUS EVERY 6 HOURS PRN
Status: DISCONTINUED | OUTPATIENT
Start: 2021-04-03 | End: 2021-04-12 | Stop reason: HOSPADM

## 2021-04-03 RX ORDER — ONDANSETRON 2 MG/ML
4 INJECTION INTRAMUSCULAR; INTRAVENOUS EVERY 6 HOURS PRN
Status: DISCONTINUED | OUTPATIENT
Start: 2021-04-03 | End: 2021-04-03

## 2021-04-03 RX ORDER — ONDANSETRON 2 MG/ML
4 INJECTION INTRAMUSCULAR; INTRAVENOUS ONCE
Status: COMPLETED | OUTPATIENT
Start: 2021-04-03 | End: 2021-04-03

## 2021-04-03 RX ORDER — LOPERAMIDE HYDROCHLORIDE 2 MG/1
2 CAPSULE ORAL EVERY 4 HOURS PRN
Status: DISCONTINUED | OUTPATIENT
Start: 2021-04-03 | End: 2021-04-04

## 2021-04-03 RX ORDER — DOXYCYCLINE HYCLATE 100 MG/1
100 CAPSULE ORAL EVERY 12 HOURS
Status: DISCONTINUED | OUTPATIENT
Start: 2021-04-03 | End: 2021-04-09

## 2021-04-03 RX ORDER — ZINC SULFATE 50(220)MG
220 CAPSULE ORAL DAILY
Status: COMPLETED | OUTPATIENT
Start: 2021-04-03 | End: 2021-04-09

## 2021-04-03 RX ORDER — ONDANSETRON 2 MG/ML
4 INJECTION INTRAMUSCULAR; INTRAVENOUS EVERY 6 HOURS PRN
Status: DISCONTINUED | OUTPATIENT
Start: 2021-04-03 | End: 2021-04-12 | Stop reason: HOSPADM

## 2021-04-03 RX ORDER — DICYCLOMINE HYDROCHLORIDE 10 MG/1
10 CAPSULE ORAL 4 TIMES DAILY PRN
Status: DISCONTINUED | OUTPATIENT
Start: 2021-04-03 | End: 2021-04-12 | Stop reason: HOSPADM

## 2021-04-03 RX ORDER — DEXAMETHASONE SODIUM PHOSPHATE 4 MG/ML
6 INJECTION, SOLUTION INTRA-ARTICULAR; INTRALESIONAL; INTRAMUSCULAR; INTRAVENOUS; SOFT TISSUE EVERY 24 HOURS
Status: COMPLETED | OUTPATIENT
Start: 2021-04-03 | End: 2021-04-12

## 2021-04-03 RX ORDER — LANOLIN ALCOHOL/MO/W.PET/CERES
6 CREAM (GRAM) TOPICAL ONCE
Status: COMPLETED | OUTPATIENT
Start: 2021-04-03 | End: 2021-04-03

## 2021-04-03 RX ORDER — MULTIVITAMIN/IRON/FOLIC ACID 18MG-0.4MG
1 TABLET ORAL DAILY
Status: DISCONTINUED | OUTPATIENT
Start: 2021-04-10 | End: 2021-04-12 | Stop reason: HOSPADM

## 2021-04-03 RX ORDER — ASCORBIC ACID 500 MG
1000 TABLET ORAL EVERY 12 HOURS SCHEDULED
Status: COMPLETED | OUTPATIENT
Start: 2021-04-03 | End: 2021-04-09

## 2021-04-03 RX ORDER — DOCUSATE SODIUM 100 MG/1
100 CAPSULE, LIQUID FILLED ORAL 2 TIMES DAILY
Status: DISCONTINUED | OUTPATIENT
Start: 2021-04-03 | End: 2021-04-12 | Stop reason: HOSPADM

## 2021-04-03 RX ORDER — MELATONIN
2000 DAILY
Status: DISCONTINUED | OUTPATIENT
Start: 2021-04-03 | End: 2021-04-12 | Stop reason: HOSPADM

## 2021-04-03 RX ORDER — ACETAMINOPHEN 325 MG/1
650 TABLET ORAL EVERY 6 HOURS PRN
Status: DISCONTINUED | OUTPATIENT
Start: 2021-04-03 | End: 2021-04-12 | Stop reason: HOSPADM

## 2021-04-03 RX ORDER — DICYCLOMINE HCL 20 MG
20 TABLET ORAL ONCE
Status: COMPLETED | OUTPATIENT
Start: 2021-04-03 | End: 2021-04-03

## 2021-04-03 RX ORDER — LACTOBACILLUS ACIDOPHILUS / LACTOBACILLUS BULGARICUS 100 MILLION CFU STRENGTH
1 GRANULES ORAL
Status: DISCONTINUED | OUTPATIENT
Start: 2021-04-03 | End: 2021-04-12 | Stop reason: HOSPADM

## 2021-04-03 RX ORDER — ACETAMINOPHEN 325 MG/1
650 TABLET ORAL ONCE
Status: COMPLETED | OUTPATIENT
Start: 2021-04-03 | End: 2021-04-03

## 2021-04-03 RX ORDER — POTASSIUM CHLORIDE 20 MEQ/1
40 TABLET, EXTENDED RELEASE ORAL ONCE
Status: COMPLETED | OUTPATIENT
Start: 2021-04-03 | End: 2021-04-03

## 2021-04-03 RX ADMIN — CEFTRIAXONE SODIUM 1000 MG: 10 INJECTION, POWDER, FOR SOLUTION INTRAVENOUS at 23:34

## 2021-04-03 RX ADMIN — ENOXAPARIN SODIUM 30 MG: 30 INJECTION SUBCUTANEOUS at 08:38

## 2021-04-03 RX ADMIN — REMDESIVIR 200 MG: 100 INJECTION, POWDER, LYOPHILIZED, FOR SOLUTION INTRAVENOUS at 04:51

## 2021-04-03 RX ADMIN — DOXYCYCLINE 100 MG: 100 CAPSULE ORAL at 04:48

## 2021-04-03 RX ADMIN — MELATONIN 6 MG: 3 TAB ORAL at 03:38

## 2021-04-03 RX ADMIN — ONDANSETRON 4 MG: 2 INJECTION INTRAMUSCULAR; INTRAVENOUS at 09:37

## 2021-04-03 RX ADMIN — GUAIFENESIN 600 MG: 600 TABLET ORAL at 08:44

## 2021-04-03 RX ADMIN — OXYCODONE HYDROCHLORIDE AND ACETAMINOPHEN 1000 MG: 500 TABLET ORAL at 22:00

## 2021-04-03 RX ADMIN — DICYCLOMINE HYDROCHLORIDE 10 MG: 10 CAPSULE ORAL at 09:39

## 2021-04-03 RX ADMIN — OXYCODONE HYDROCHLORIDE AND ACETAMINOPHEN 1000 MG: 500 TABLET ORAL at 08:41

## 2021-04-03 RX ADMIN — DOCUSATE SODIUM 100 MG: 100 CAPSULE, LIQUID FILLED ORAL at 08:40

## 2021-04-03 RX ADMIN — POTASSIUM CHLORIDE 40 MEQ: 1500 TABLET, EXTENDED RELEASE ORAL at 03:36

## 2021-04-03 RX ADMIN — Medication 2000 UNITS: at 08:40

## 2021-04-03 RX ADMIN — Medication 1 PACKET: at 09:49

## 2021-04-03 RX ADMIN — ENOXAPARIN SODIUM 30 MG: 30 INJECTION SUBCUTANEOUS at 22:00

## 2021-04-03 RX ADMIN — DEXAMETHASONE SODIUM PHOSPHATE 6 MG: 4 INJECTION, SOLUTION INTRA-ARTICULAR; INTRALESIONAL; INTRAMUSCULAR; INTRAVENOUS; SOFT TISSUE at 04:49

## 2021-04-03 RX ADMIN — ACETAMINOPHEN 650 MG: 325 TABLET, FILM COATED ORAL at 03:35

## 2021-04-03 RX ADMIN — ONDANSETRON 4 MG: 2 INJECTION INTRAMUSCULAR; INTRAVENOUS at 02:33

## 2021-04-03 RX ADMIN — ACETAMINOPHEN 650 MG: 325 TABLET, FILM COATED ORAL at 22:00

## 2021-04-03 RX ADMIN — DOXYCYCLINE 100 MG: 100 CAPSULE ORAL at 16:42

## 2021-04-03 RX ADMIN — DICYCLOMINE HYDROCHLORIDE 20 MG: 20 TABLET ORAL at 03:33

## 2021-04-03 RX ADMIN — CEFTRIAXONE SODIUM 1000 MG: 10 INJECTION, POWDER, FOR SOLUTION INTRAVENOUS at 02:36

## 2021-04-03 RX ADMIN — LOPERAMIDE HYDROCHLORIDE 2 MG: 2 CAPSULE ORAL at 15:43

## 2021-04-03 RX ADMIN — ZINC SULFATE 220 MG (50 MG) CAPSULE 220 MG: CAPSULE at 08:42

## 2021-04-03 RX ADMIN — KETOROLAC TROMETHAMINE 30 MG: 30 INJECTION, SOLUTION INTRAMUSCULAR at 02:34

## 2021-04-03 RX ADMIN — Medication 7 PACKET: at 16:42

## 2021-04-03 RX ADMIN — GUAIFENESIN 600 MG: 600 TABLET ORAL at 22:00

## 2021-04-03 NOTE — RESPIRATORY THERAPY NOTE
Responded to ED for Covid positive patient who according to EMS had an SPO2 of 91 on 6LNC pt was transferred to ED bed, pediatric sat probe was placed on nail bed below the patient's glitter artifical nails and SpO2 was reading 94

## 2021-04-03 NOTE — ASSESSMENT & PLAN NOTE
Patient tested positive on 03/29, has had worsening shortness of breath, fevers, cough   Moderate COVID pathway as below    Supplemental O2 with 6L saturating 94%; wean as tolerated to maintain saturations >92%   CXR reviewed, showing worsening bilateral opacities, official read pending   CBC and CMP daily    Cardiac and inflammatory markers pending   D-dimer elevated at 0 5, trend   Ceftriaxone IV 1g daily and doxycycline 100 mg PO BID; if procalcitonin negative x2 consider discontinuing   Dexamethasone 6mg IV daily X10 days   Remdesivir 200 mg IV day 1 and 100 mg IV on day 2-5   Consideration for convalescent plasma therapy    AC with Lovenox given D-dimer    OOB TID, ambulation and mobilization encouraged   Self proning as tolerated

## 2021-04-03 NOTE — ASSESSMENT & PLAN NOTE
· In the setting of COVID-19  · Currently saturating at 94% on 6 L via nasal cannula, wean as tolerated  · Respiratory protocol

## 2021-04-03 NOTE — ED PROVIDER NOTES
History  Chief Complaint   Patient presents with    Weakness - Generalized     Pt presents to ED with Abd pain, n/v/d  Pt reports generalized weakness and cough  Pt covid positive     25-year-old otherwise healthy female who presents to the emergency department via EMS for complaint of shortness of breath and weakness  Upon EMS arrival, the patient appeared pale and tachypneic, with increased work of breathing  O2 saturation was in the 70s on room air  6 L nasal cannula oxygen was applied, patient now saturating average 94%  There is no history of asthma, COPD, current or past tobacco or vaping  She was evaluated in this ED on 03/29/2021 for flu-like symptoms and COVID test was positive  At this point, she was not having respiratory symptoms  Her other symptoms include generalized abdominal pain, nonbloody diarrhea (3 episodes in the past 24 hours), fever and chills, dry cough, nausea and vomiting, and moderate chest pain  Prior to Admission Medications   Prescriptions Last Dose Informant Patient Reported? Taking? Ascorbic Acid (vitamin C) 1000 MG tablet 4/2/2021 at Unknown time Self No Yes   Sig: Take 1 tablet (1,000 mg total) by mouth 2 (two) times a day for 7 days   Misc   Devices (Pulse Oximeter) MISC  Self No No   Sig: Please check your oxygen saturation every 4 hours while awake or if you experience shortness of breath   cephalexin (KEFLEX) 250 mg capsule 4/2/2021 at Unknown time Self No Yes   Sig: Take 2 capsules (500 mg total) by mouth every 6 (six) hours for 7 days   cholecalciferol (VITAMIN D3) 1,000 units tablet 4/2/2021 at Unknown time Self No Yes   Sig: Take 2 tablets (2,000 Units total) by mouth daily for 7 days   ibuprofen (MOTRIN) 600 mg tablet Past Week at Unknown time Self No Yes   Sig: Take 1 tablet (600 mg total) by mouth every 6 (six) hours as needed for moderate pain   multivitamin-iron-minerals-folic acid (CENTRUM) chewable tablet 4/2/2021 at Unknown time Self No Yes Sig: Chew 1 tablet daily for 7 days   ondansetron (ZOFRAN) 4 mg tablet 4/2/2021 at Unknown time Self No Yes   Sig: Take 1 tablet (4 mg total) by mouth every 6 (six) hours      Facility-Administered Medications: None       History reviewed  No pertinent past medical history  Past Surgical History:   Procedure Laterality Date    TONSILLECTOMY         History reviewed  No pertinent family history  I have reviewed and agree with the history as documented  E-Cigarette/Vaping     E-Cigarette/Vaping Substances     Social History     Tobacco Use    Smoking status: Never Smoker    Smokeless tobacco: Never Used   Substance Use Topics    Alcohol use: Never     Frequency: Never    Drug use: Never       Review of Systems   Constitutional: Positive for chills, fatigue and fever  Negative for appetite change  HENT: Negative for congestion, postnasal drip, rhinorrhea and sore throat  Respiratory: Positive for cough and shortness of breath  Negative for chest tightness and wheezing  Cardiovascular: Positive for chest pain  Negative for palpitations  Gastrointestinal: Positive for abdominal pain, diarrhea, nausea and vomiting  Genitourinary: Negative for decreased urine volume, difficulty urinating, dysuria, flank pain, frequency, hematuria and urgency  Musculoskeletal: Negative for back pain, myalgias, neck pain and neck stiffness  Skin: Negative for color change and rash  Neurological: Positive for weakness  Negative for dizziness, syncope, light-headedness and headaches  Hematological: Negative for adenopathy  All other systems reviewed and are negative  Physical Exam  Physical Exam  Vitals signs reviewed  Constitutional:       General: She is awake  She is not in acute distress  Appearance: Normal appearance  She is well-developed  She is not ill-appearing or toxic-appearing  HENT:      Head: Normocephalic and atraumatic  Mouth/Throat:      Lips: Pink        Comments: Oropharyngeal exam deferred due to COVID-19 transmission risk precautions  Exam would not change clinical management and outcome  Eyes:      Extraocular Movements: Extraocular movements intact  Conjunctiva/sclera: Conjunctivae normal       Pupils: Pupils are equal, round, and reactive to light  Neck:      Musculoskeletal: Normal range of motion and neck supple  Cardiovascular:      Rate and Rhythm: Normal rate and regular rhythm  Pulses: Normal pulses  Pulmonary:      Effort: Pulmonary effort is normal       Breath sounds: Normal breath sounds  Musculoskeletal: Normal range of motion  Skin:     General: Skin is warm  Capillary Refill: Capillary refill takes less than 2 seconds  Findings: No erythema, lesion or rash  Neurological:      Mental Status: She is alert and oriented to person, place, and time           Vital Signs  ED Triage Vitals   Temperature Pulse Respirations Blood Pressure SpO2   04/03/21 0217 04/03/21 0217 04/03/21 0217 04/03/21 0217 04/03/21 0217   (!) 100 7 °F (38 2 °C) 89 (!) 30 150/70 94 %      Temp Source Heart Rate Source Patient Position - Orthostatic VS BP Location FiO2 (%)   04/03/21 0217 04/03/21 0217 04/03/21 0217 04/03/21 0217 --   Oral Monitor Sitting Left arm       Pain Score       04/03/21 0216       Worst Possible Pain           Vitals:    04/06/21 2326 04/07/21 0732 04/07/21 1501 04/07/21 2329   BP: 147/74 95/57 130/69 132/67   Pulse: 60 59 60 60   Patient Position - Orthostatic VS:   Lying          Visual Acuity      ED Medications  Medications   cholecalciferol (VITAMIN D3) tablet 2,000 Units (2,000 Units Oral Given 4/7/21 0922)   ceftriaxone (ROCEPHIN) 1 g/50 mL in dextrose IVPB (1,000 mg Intravenous New Bag 4/7/21 2327)   doxycycline hyclate (VIBRAMYCIN) capsule 100 mg (100 mg Oral Given 4/7/21 1712)   ascorbic acid (VITAMIN C) tablet 1,000 mg (1,000 mg Oral Given 4/7/21 2120)   zinc sulfate (ZINCATE) capsule 220 mg (220 mg Oral Given 4/7/21 0922) Followed by   multivitamin-minerals (CENTRUM ADULTS) tablet 1 tablet (has no administration in time range)   dexamethasone (DECADRON) injection 6 mg (6 mg Intravenous Given 214)   enoxaparin (LOVENOX) subcutaneous injection 30 mg (30 mg Subcutaneous Given 21)   docusate sodium (COLACE) capsule 100 mg (100 mg Oral Not Given 21)   acetaminophen (TYLENOL) tablet 650 mg (650 mg Oral Given 4/3/21 2200)   guaiFENesin (MUCINEX) 12 hr tablet 600 mg (600 mg Oral Given 21)   dicyclomine (BENTYL) capsule 10 mg (10 mg Oral Given 4/3/21 0939)   ondansetron (ZOFRAN) injection 4 mg (4 mg Intravenous Given 4/3/21 0937)   metoclopramide (REGLAN) injection 10 mg (has no administration in time range)   lactobacillus acidophilus-bulgaricus [facility]) packet 1 packet (1 packet Oral Given 21)   vancomycin (VANCOCIN) oral solution 125 mg (125 mg Oral Given 21)   insulin lispro (HumaLOG) 100 units/mL subcutaneous injection 2-12 Units (10 Units Subcutaneous Given 21)   insulin lispro (HumaLOG) 100 units/mL subcutaneous injection 2-12 Units (8 Units Subcutaneous Given 21)   ondansetron (ZOFRAN) injection 4 mg (4 mg Intravenous Given 4/3/21 0233)   ketorolac (TORADOL) injection 30 mg (30 mg Intravenous Given 4/3/21 0234)   dicyclomine (BENTYL) tablet 20 mg (20 mg Oral Given 4/3/21 0333)   acetaminophen (TYLENOL) tablet 650 mg (650 mg Oral Given 4/3/21 0335)   ceftriaxone (ROCEPHIN) 1 g/50 mL in dextrose IVPB (0 mg Intravenous Stopped 4/3/21 0341)   potassium chloride (K-DUR,KLOR-CON) CR tablet 40 mEq (40 mEq Oral Given 4/3/21 0336)   melatonin tablet 6 mg (6 mg Oral Given 4/3/21 0338)   remdesivir (Veklury) 200 mg in sodium chloride 0 9 % 250 mL IVPB (0 mg Intravenous Stopped 4/3/21 0531)     Followed by   remdesivir Lisa ) 100 mg in sodium chloride 0 9 % 250 mL IVPB (100 mg Intravenous New Bag 21 1046)   insulin regular (HumuLIN R,NovoLIN R) injection 10 Units (10 Units Subcutaneous Given 4/5/21 1734)       Diagnostic Studies  Results Reviewed     Procedure Component Value Units Date/Time    Blood culture #1 [[id number]] Collected: 04/03/21 0227    Lab Status: Preliminary result Specimen: Blood from Hand, Right Updated: 04/07/21 1301     Blood Culture No Growth After 4 Days  Blood culture #2 [[id number]] Collected: 04/03/21 0218    Lab Status: Preliminary result Specimen: Blood from Arm, Right Updated: 04/07/21 1301     Blood Culture No Growth After 4 Days      Procalcitonin Reflex [[id number]]  (Abnormal) Collected: 04/04/21 0509    Lab Status: Final result Specimen: Blood from Arm, Left Updated: 04/04/21 1500     Procalcitonin 1 11 ng/ml     Clostridium difficile toxin by PCR with EIA [[id number]]  (Abnormal) Collected: 04/03/21 1228    Lab Status: Final result Specimen: Stool from Per Rectum Updated: 04/04/21 1310     C difficile toxin by PCR Positive      C difficile toxin by PCR  Positive     C difficile Toxins A+B, EIA Positive    Comprehensive metabolic panel [[id number]]  (Abnormal) Collected: 04/04/21 0509    Lab Status: Final result Specimen: Blood from Arm, Left Updated: 04/04/21 9463     Sodium 141 mmol/L      Potassium 3 6 mmol/L      Chloride 104 mmol/L      CO2 26 mmol/L      ANION GAP 11 mmol/L      BUN 20 mg/dL      Creatinine 0 91 mg/dL      Glucose 348 mg/dL      Calcium 9 2 mg/dL      Corrected Calcium 10 5 mg/dL      AST 19 U/L      ALT 23 U/L      Alkaline Phosphatase 60 U/L      Total Protein 6 9 g/dL      Albumin 2 4 g/dL      Total Bilirubin 0 23 mg/dL      eGFR 68 ml/min/1 73sq m     Narrative:      [place] guidelines for Chronic Kidney Disease (CKD):     Stage 1 with normal or high GFR (GFR > 90 mL/min/1 73 square meters)    Stage 2 Mild CKD (GFR = 60-89 mL/min/1 73 square meters)    Stage 3A Moderate CKD (GFR = 45-59 mL/min/1 73 square meters)    Stage 3B Moderate CKD (GFR = 30-44 mL/min/1 73 square meters)    Stage 4 Severe CKD (GFR = 15-29 mL/min/1 73 square meters)    Stage 5 End Stage CKD (GFR <15 mL/min/1 73 square meters)  Note: GFR calculation is accurate only with a steady state creatinine    C-reactive protein [[id number]]  (Abnormal) Collected: 04/04/21 0509    Lab Status: Final result Specimen: Blood from Arm, Left Updated: 04/04/21 0627     CRP 93 7 mg/L     D-dimer, quantitative [[id number]]  (Abnormal) Collected: 04/04/21 0509    Lab Status: Final result Specimen: Blood from Arm, Left Updated: 04/04/21 0619     D-Dimer, Quant 0 66 ug/ml FEU     CBC and differential [[id number]]  (Abnormal) Collected: 04/04/21 0509    Lab Status: Final result Specimen: Blood from Arm, Left Updated: 04/04/21 0536     WBC 10 24 Thousand/uL      RBC 5 26 Million/uL      Hemoglobin 15 9 g/dL      Hematocrit 48 2 %      MCV 92 fL      MCH 30 2 pg      MCHC 33 0 g/dL      RDW 12 7 %      MPV 10 5 fL      Platelets 060 Thousands/uL      nRBC 0 /100 WBCs     Narrative:       See manual diff done within 3 days      Procalcitonin with AM Reflex [[id number]]  (Abnormal) Collected: 04/03/21 0218    Lab Status: Final result Specimen: Blood from Arm, Right Updated: 04/03/21 1340     Procalcitonin 2 56 ng/ml     Magnesium [[id number]]     Lab Status: No result Specimen: Blood     C-reactive protein [[id number]]     Lab Status: No result Specimen: Blood     Ferritin [[id number]]     Lab Status: No result Specimen: Blood     NT-BNP PRO [[id number]]     Lab Status: No result Specimen: Blood     CK (with reflex to MB) [[id number]]     Lab Status: No result Specimen: Blood     CBC and differential [[id number]]  (Abnormal) Collected: 04/03/21 0218    Lab Status: Final result Specimen: Blood from Arm, Right Updated: 04/03/21 0310     WBC 10 79 Thousand/uL      RBC 5 26 Million/uL      Hemoglobin 15 7 g/dL      Hematocrit 47 0 %      MCV 89 fL      MCH 29 8 pg      MCHC 33 4 g/dL      RDW 12 7 %      MPV 10 4 fL      Platelets 605 Thousands/uL      nRBC 0 /100 WBCs     Manual Differential(PHLEBS Do Not Order) [[id number]]  (Abnormal) Collected: 04/03/21 0218    Lab Status: Final result Specimen: Blood from Arm, Right Updated: 04/03/21 0310     Segmented % 78 %      Bands % 4 %      Lymphocytes % 15 %      Monocytes % 2 %      Eosinophils, % 0 %      Basophils % 0 %      Atypical Lymphocytes % 1 %      Absolute Neutrophils 8 85 Thousand/uL      Lymphocytes Absolute 1 62 Thousand/uL      Monocytes Absolute 0 22 Thousand/uL      Eosinophils Absolute 0 00 Thousand/uL      Basophils Absolute 0 00 Thousand/uL      Total Counted 100     Platelet Estimate Adequate    Troponin I [[id number]]  (Normal) Collected: 04/03/21 0218    Lab Status: Final result Specimen: Blood from Arm, Right Updated: 04/03/21 0305     Troponin I <0 02 ng/mL     Lactic acid [[id number]]  (Normal) Collected: 04/03/21 0218    Lab Status: Final result Specimen: Blood from Arm, Right Updated: 04/03/21 0305     LACTIC ACID 1 8 mmol/L     Narrative:      Result may be elevated if tourniquet was used during collection      Hepatic function panel [[id number]]  (Abnormal) Collected: 04/03/21 0218    Lab Status: Final result Specimen: Blood from Arm, Right Updated: 04/03/21 0302     Total Bilirubin 0 39 mg/dL      Bilirubin, Direct 0 13 mg/dL      Alkaline Phosphatase 63 U/L      AST 29 U/L      ALT 30 U/L      Total Protein 7 1 g/dL      Albumin 2 7 g/dL     Basic metabolic panel [[id number]]  (Abnormal) Collected: 04/03/21 0218    Lab Status: Final result Specimen: Blood from Arm, Right Updated: 04/03/21 0302     Sodium 138 mmol/L      Potassium 3 2 mmol/L      Chloride 100 mmol/L      CO2 26 mmol/L      ANION GAP 12 mmol/L      BUN 9 mg/dL      Creatinine 0 86 mg/dL      Glucose 274 mg/dL      Calcium 8 8 mg/dL      eGFR 73 ml/min/1 73sq m     Narrative:      [place] guidelines for Chronic Kidney Disease (CKD):     Stage 1 with normal or high GFR (GFR > 90 mL/min/1 73 square meters)   Stage 2 Mild CKD (GFR = 60-89 mL/min/1 73 square meters)    Stage 3A Moderate CKD (GFR = 45-59 mL/min/1 73 square meters)    Stage 3B Moderate CKD (GFR = 30-44 mL/min/1 73 square meters)    Stage 4 Severe CKD (GFR = 15-29 mL/min/1 73 square meters)    Stage 5 End Stage CKD (GFR <15 mL/min/1 73 square meters)  Note: GFR calculation is accurate only with a steady state creatinine    D-Dimer [[id number]]  (Abnormal) Collected: 04/03/21 0218    Lab Status: Final result Specimen: Blood from Arm, Right Updated: 04/03/21 0259     D-Dimer, Quant 0 85 ug/ml FEU     Fingerstick Glucose (POCT) [[id number]]  (Abnormal) Collected: 04/03/21 0233    Lab Status: Final result Updated: 04/03/21 0246     POC Glucose 247 mg/dl                  XR chest 1 view portable   Final Result by Luna Loving MD (04/03 1116)      Worsening bilateral multi lobar groundglass infiltrates  This study demonstrates a significant  finding and was documented as such in [facility] for liaison and referring practitioner notification  Workstation performed: [id number]                    Procedures  Procedures         ED Course  ED Course as of Apr 08 0251   Sat Apr 03, 2021 0226 EKG shows normal sinus rhythm, rate 81, no acute ST segment elevation or depression, T-wave flattening in lead aVL, , QRS duration 82, QR interval 134, no arrhythmia or heart block      0319 WBC(!): 10 79   0319 D-Dimer, Quant(!): 0 85   0320 POC Glucose(!): 247   0323 RR improved  O2 sat also improved to 97%  WOB and tachypnea improved  Asking for something for sleep, will administer melatonin                                 SBIRT 22yo+      Most Recent Value   SBIRT (24 yo +)   In order to provide better care to our patients, we are screening all of our patients for alcohol and drug use  Would it be okay to ask you these screening questions? Yes Filed at: 04/03/2021 0220   Initial Alcohol Screen: US AUDIT-C    1   How often do you have a drink containing alcohol? 1 Filed at: 04/03/2021 0220   2  How many drinks containing alcohol do you have on a typical day you are drinking? 0 Filed at: 04/03/2021 0220   3b  FEMALE Any Age, or MALE 65+: How often do you have 4 or more drinks on one occassion? 0 Filed at: 04/03/2021 0220   Audit-C Score  1 Filed at: 04/03/2021 4951   OMAR: How many times in the past year have you    Used an illegal drug or used a prescription medication for non-medical reasons? Never Filed at: 04/03/2021 0220                    MDM  Number of Diagnoses or Management Options  COVID-19 virus infection:   Hypoxia:   Weakness:   Diagnosis management comments: On exam, ill-appearing female, fatigued, pale, tachypneic with increased work of breathing, saturating 94% on 6 L nasal cannula, exam as above  COVID pathway labs and treatment initiated  Do not feel we did to escalate to mid flow, high flow, or BiPAP at this point  Will continue to monitor  Patient to be admitted for hypoxemia secondary to [address]  Patient meets sepsis criteria however sepsis parameters and fluid bolus will be withheld (unless blood pressure support or electrolyte repletion are necessary), as administration of fluids in COVID 19 has been associated with adverse outcomes (pulmonary edema, heart failure, circulatory collapse)          Amount and/or Complexity of Data Reviewed  Clinical lab tests: reviewed and ordered  Tests in the radiology section of CPT®: reviewed and ordered  Tests in the medicine section of CPT®: ordered and reviewed  Discussion of test results with the performing providers: yes  Decide to obtain previous medical records or to obtain history from someone other than the patient: yes  Obtain history from someone other than the patient: yes  Review and summarize past medical records: yes  Discuss the patient with other providers: yes  Independent visualization of images, tracings, or specimens: yes    Patient Progress  Patient progress: stable (See ED course note for dispo and plan  I reviewed and discussed all lab and imaging findings with the patient at bedside  I answered any and all questions the patient had regarding emergency department course of evaluation and treatment  The patient verbalized understanding of and agreement with plan   )      Disposition  Final diagnoses:   Weakness   Hypoxia   COVID-19 virus infection     Time reflects when diagnosis was documented in both MDM as applicable and the Disposition within this note     Time User Action Codes Description Comment    4/3/2021  3:20 AM Campbell Soulier Add [R53 1] Weakness     4/3/2021  3:20 AM Jeremy Soulier Add [R09 02] Hypoxia     4/3/2021  3:20 AM Campbell Soulier Add [U07 1] OMGLQ-[id number] virus infection     4/3/2021  4:07 AM Ifeoma Glass Add [U07 1,  J12 82] Pneumonia due to COVID-19 virus     4/3/2021  4:07 AM Ifeoma Glass Add [J96 01] Acute respiratory failure with hypoxia [facility])       ED Disposition     ED Disposition Condition Date/Time Comment    Admit Stable Sat Apr 3, 2021  3:20 AM Case was discussed with Shira Muñoz and the patient's admission status was agreed to be Admission Status: inpatient status to the service of Dr Bassem Ely           Follow-up Information    None         Current Discharge Medication List      CONTINUE these medications which have NOT CHANGED    Details   Ascorbic Acid (vitamin C) 1000 MG tablet Take 1 tablet (1,000 mg total) by mouth 2 (two) times a day for 7 days  Qty: 14 tablet, Refills: 0    Associated Diagnoses: Pneumonia due to COVID-19 virus      cholecalciferol (VITAMIN D3) 1,000 units tablet Take 2 tablets (2,000 Units total) by mouth daily for 7 days  Qty: 14 tablet, Refills: 0    Associated Diagnoses: Pneumonia due to COVID-19 virus      ibuprofen (MOTRIN) 600 mg tablet Take 1 tablet (600 mg total) by mouth every 6 (six) hours as needed for moderate pain  Qty: 20 tablet, Refills: 0    Associated Diagnoses: Ureterolithiasis      multivitamin-iron-minerals-folic acid (CENTRUM) chewable tablet Chew 1 tablet daily for 7 days  Qty: 7 tablet, Refills: 0    Associated Diagnoses: Pneumonia due to COVID-19 virus      ondansetron (ZOFRAN) 4 mg tablet Take 1 tablet (4 mg total) by mouth every 6 (six) hours  Qty: 12 tablet, Refills: 0    Associated Diagnoses: Pneumonia due to COVID-19 virus      Misc  Devices (Pulse Oximeter) MISC Please check your oxygen saturation every 4 hours while awake or if you experience shortness of breath  Qty: 1 each, Refills: 0    Associated Diagnoses: Pneumonia due to COVID-19 virus         STOP taking these medications       cephalexin (KEFLEX) 250 mg capsule Comments:   Reason for Stopping:             No discharge procedures on file      PDMP Review     None          ED Provider  Electronically Signed by           Dea Chacon PA-C  04/08/21 4217

## 2021-04-03 NOTE — CONSULTS
Consultation - Pulmonary Medicine   Isadora Hui 63 y o  female MRN: [id number]  Unit/Bed#: ED 08 Encounter: [id number]      Assessment:  1  Acute hypoxemic respiratory failure  2  COVID-19 pneumonia    Plan:   Patient with about 12 days of symptoms, positive for COVID 3/29 with worsening symptoms at home  Chest x-ray appears with worsened opacities from recent x-ray on 03/29  Continue treatment per moderate COVID pathway  - Had required up to 6 L overnight, currently I titrated her down to 4 L with sats in the mid 90s  Titrate to maintain O2 sats greater than 90%  - Trend inflammatory markers - D-dimer 0 85, CPR pending  - Dexamethasone 6mg daily day 1/10  - Prophylactic dose Lovenox  - Currently on ceftriaxone and doxycycline - procal is pending  If negative x 2 can discontinue  -  Remdesivir day 1/5  Out of the window for convalescent plasma, currently no need for Actemra  - Crackles on exam, does not appear fluid overloaded, no cardiac history  BNP pending, can consider lasix if elevated  - Discussed with patient self proning or lying on side, OOB as able, use of IS    Will see patient on Monday, call in the interim with any questions  Discussed with Dr Xin WOLFE  History of Present Illness   Physician Requesting Consult: Ayleen Sheehan MD  Reason for Consult / Principal Problem: COVID-19  Hx and PE limited by: None  HPI: Isadora Hui is a 63y o  year old female with remote smoking history who presents with complaint of worsening shortness of breath, cough, generalized fatigue as well as nausea, vomiting and diarrhea  Symptoms began about 12 days ago, her  is also sick and in the ICU at [facility]  She denies any prior pulmonary history, has a remote smoking history  Consults    Review of Systems   Constitutional: Positive for fatigue  HENT: Negative  Respiratory: Positive for cough and shortness of breath  Cardiovascular: Negative      Gastrointestinal: Positive for diarrhea, nausea and vomiting  Genitourinary: Negative  Musculoskeletal: Negative  Skin: Negative  Allergic/Immunologic: Negative  Neurological: Negative  Psychiatric/Behavioral: Negative  Historical Information   No past medical history on file  Past Surgical History:   Procedure Laterality Date    TONSILLECTOMY       Social History   Social History     Substance and Sexual Activity   Alcohol Use Never    Frequency: Never     Social History     Substance and Sexual Activity   Drug Use Never     E-Cigarette/Vaping     E-Cigarette/Vaping Substances     Social History     Tobacco Use   Smoking Status Never Smoker   Smokeless Tobacco Never Used     Occupational History:     Family History: No family history on file      Meds/Allergies   all current active meds have been reviewed, pertinent pulmonary meds have been reviewed and current meds:   Current Facility-Administered Medications   Medication Dose Route Frequency    acetaminophen (TYLENOL) tablet 650 mg  650 mg Oral Q6H PRN    ascorbic acid (VITAMIN C) tablet 1,000 mg  1,000 mg Oral Q12H [address]    ceftriaxone (ROCEPHIN) 1 g/50 mL in dextrose IVPB  1,000 mg Intravenous Q24H    cholecalciferol (VITAMIN D3) tablet 2,000 Units  2,000 Units Oral Daily    dexamethasone (DECADRON) injection 6 mg  6 mg Intravenous Q24H    dicyclomine (BENTYL) capsule 10 mg  10 mg Oral 4x Daily PRN    docusate sodium (COLACE) capsule 100 mg  100 mg Oral BID    doxycycline hyclate (VIBRAMYCIN) capsule 100 mg  100 mg Oral Q12H    enoxaparin (LOVENOX) subcutaneous injection 30 mg  30 mg Subcutaneous Q12H FRANCA    guaiFENesin (MUCINEX) 12 hr tablet 600 mg  600 mg Oral Q12H FRANCA    metoclopramide (REGLAN) injection 10 mg  10 mg Intravenous Q6H PRN    zinc sulfate (ZINCATE) capsule 220 mg  220 mg Oral Daily    Followed by   Daylin Traylor ON 4/10/2021] multivitamin-minerals (CENTRUM ADULTS) tablet 1 tablet  1 tablet Oral Daily    ondansetron (ZOFRAN) injection 4 mg 4 mg Intravenous Q6H PRN    [START ON 4/4/2021] remdesivir (Veklury) 100 mg in sodium chloride 0 9 % 250 mL IVPB  100 mg Intravenous Q24H       No Known Allergies    Objective   Vitals: Blood pressure 112/57, pulse 61, temperature 98 9 °F (37 2 °C), temperature source Oral, resp  rate 22, height 5' 5" (1 651 m), weight 81 2 kg (179 lb), SpO2 97 %  ,Body mass index is 29 79 kg/m²  Intake/Output Summary (Last 24 hours) at 4/3/2021 0830  Last data filed at 4/3/2021 0531  Gross per 24 hour   Intake 400 ml   Output --   Net 400 ml     Invasive Devices     Peripheral Intravenous Line            Peripheral IV 04/03/21 Dorsal (posterior); Left Hand less than 1 day    Peripheral IV 04/03/21 Right Antecubital less than 1 day    Peripheral IV 04/03/21 Right Hand less than 1 day                Physical Exam  Constitutional:       General: She is not in acute distress  Appearance: She is ill-appearing  HENT:      Head: Normocephalic and atraumatic  Eyes:      Pupils: Pupils are equal, round, and reactive to light  Neck:      Musculoskeletal: Normal range of motion  Cardiovascular:      Rate and Rhythm: Normal rate and regular rhythm  Pulmonary:      Effort: Pulmonary effort is normal  No respiratory distress  Breath sounds: Examination of the right-lower field reveals rales  Examination of the left-lower field reveals rales  Rales present  No decreased breath sounds, wheezing or rhonchi  Musculoskeletal: Normal range of motion  Right lower leg: No edema  Left lower leg: No edema  Skin:     General: Skin is warm and dry  Neurological:      Mental Status: She is alert and oriented to person, place, and time  Psychiatric:         Mood and Affect: Mood normal          Behavior: Behavior normal          Lab Results:   I have personally reviewed pertinent lab results  , BNP: No results found for: BNP, CBC:   Lab Results   Component Value Date    WBC 10 79 (H) 04/03/2021    HGB 15 7 (H) 04/03/2021 HCT 47 0 (H) 04/03/2021    MCV 89 04/03/2021     04/03/2021    MCH 29 8 04/03/2021    MCHC 33 4 04/03/2021    RDW 12 7 04/03/2021    MPV 10 4 04/03/2021    NRBC 0 04/03/2021   , CMP:   Lab Results   Component Value Date    SODIUM 138 04/03/2021    K 3 2 (L) 04/03/2021     04/03/2021    CO2 26 04/03/2021    BUN 9 04/03/2021    CREATININE 0 86 04/03/2021    CALCIUM 8 8 04/03/2021    AST 29 04/03/2021    ALT 30 04/03/2021    ALKPHOS 63 04/03/2021    EGFR 73 04/03/2021     Imaging Studies: I have personally reviewed pertinent reports  and I have personally reviewed pertinent films in PACS  EKG, Pathology, and Other Studies: I have personally reviewed pertinent reports      VTE Prophylaxis: Sequential compression device (Venodyne)  and Enoxaparin (Lovenox)    Code Status: Level 1 - Full Code  Advance Directive and Living Will:      Power of :    POLST:

## 2021-04-03 NOTE — ASSESSMENT & PLAN NOTE
Patient meeting sepsis criteria with tachypnea, tachycardia    In the setting of COVID-19  · Lactic acid negative  · Covering with ceftriaxone and azithromycin for possible superimposed bacterial pneumonia  · Patient also currently being treated for UTI  · Blood cultures, procalcitonin pending  · Patient currently hemodynamically stable

## 2021-04-03 NOTE — H&P
[facility] 1960[facility] 63 [facility]  [facility] MRN: [id number]  Unit/Bed#: ED 08 Encounter: [id number]  Primary Care Provider: No primary care provider on file  Date and time admitted to hospital: 4/3/2021  2:12 AM    * Pneumonia due to COVID-19 virus  Assessment & Plan  Patient tested positive on 03/29, has had worsening shortness of breath, fevers, cough   Moderate COVID pathway as below    Supplemental O2 with 6L saturating 94%; wean as tolerated to maintain saturations >92%   CXR reviewed, showing worsening bilateral opacities, official read pending   CBC and CMP daily    Cardiac and inflammatory markers pending   D-dimer elevated at 0 5, trend   Ceftriaxone IV 1g daily and doxycycline 100 mg PO BID; if procalcitonin negative x2 consider discontinuing   Dexamethasone 6mg IV daily X10 days   Remdesivir 200 mg IV day 1 and 100 mg IV on day 2-5   Consideration for convalescent plasma therapy    AC with Lovenox given D-dimer    OOB TID, ambulation and mobilization encouraged   Self proning as tolerated      Abdominal pain  Assessment & Plan  Patient presents with generalized abdominal pain, nausea, vomiting, and diarrhea  · In the setting of COVID-19  · LFTs within normal limits  · Supportive care with Zofran, Bentyl, aqua K-pad  · Likely sequela of COVID-19, however if abdominal pain persists or worsens can consider imaging    Sepsis [facility])  Assessment & Plan  Patient meeting sepsis criteria with tachypnea, tachycardia    In the setting of COVID-19  · Lactic acid negative  · Covering with ceftriaxone and azithromycin for possible superimposed bacterial pneumonia  · Patient also currently being treated for UTI  · Blood cultures, procalcitonin pending  · Patient currently hemodynamically stable    Acute respiratory failure (HCC)  Assessment & Plan  · In the setting of COVID-19  · Currently saturating at 94% on 6 L via nasal cannula, wean as tolerated  · Respiratory protocol    VTE Prophylaxis: Enoxaparin (Lovenox)  / sequential compression device   Code Status:  Full code  POLST: POLST is not applicable to this patient    Anticipated Length of Stay:  Patient will be admitted on an Inpatient basis with an anticipated length of stay of  > 2 midnights  Justification for Hospital Stay:  Patient presents with COVID-19, requiring supplemental oxygen, IV room does severe, and pulmonology consult    Total Time for Visit, including Counseling / Coordination of Care: 1 hour  Greater than 50% of this total time spent on direct patient counseling and coordination of care  Chief Complaint:     Chief Complaint   Patient presents with    Weakness - Generalized     Pt presents to ED with Abd pain, n/v/d  Pt reports generalized weakness and cough  Pt covid positive         History of Present Illness:    Shelia Toledo is a 63 y o  female with no past medical history who presents with worsening shortness of breath, fever, abdominal pain, diarrhea, vomiting, and fever  Patient tested positive for COVID on 03/29  Her  is also unfortunately sick with COVID and is in an ICU at this time  Patient's daughter is also sick with COVID at home  The patient reports she has been having symptoms for about 10-11 days  Patient was seen at [facility] ED on 03/29 which she tested positive for COVID, but at that time she was not having any respiratory symptoms  Patient reports over the last few days she has been feeling more weak and short of breath  On arrival to the ED patient was initially hypoxic to the 70s on room air  Patient is on saturating at 94% on 6 L and is able to speak without conversational dyspnea  Patient reports she continues to feel very weak and fatigued, as well as nauseous  She denies any blood in her vomit or stool  She denies any severe abdominal pain, does reports generalized nausea and tenderness  Patient also was recently started on Keflex due to UTI    Patient has no previous lung disease, and the last time she smoked was and she was 11years old  Review of Systems:    Review of Systems   Constitutional: Positive for activity change, appetite change, chills, fatigue and fever  HENT: Negative for congestion, postnasal drip and sinus pain  Eyes: Negative for visual disturbance  Respiratory: Positive for cough, chest tightness and shortness of breath  Negative for wheezing  Cardiovascular: Negative for chest pain, palpitations and leg swelling  Gastrointestinal: Positive for abdominal pain, diarrhea, nausea and vomiting  Negative for blood in stool and constipation  Genitourinary: Negative for dysuria, flank pain and hematuria  Musculoskeletal: Negative for back pain and myalgias  Skin: Negative for rash  Neurological: Positive for weakness and headaches  Negative for dizziness and light-headedness  Hematological: Does not bruise/bleed easily  Psychiatric/Behavioral: Negative for behavioral problems  Past Medical and Surgical History:     No past medical history on file  Past Surgical History:   Procedure Laterality Date    TONSILLECTOMY         Meds/Allergies:    Prior to Admission medications    Medication Sig Start Date End Date Taking?  Authorizing Provider   Ascorbic Acid (vitamin C) 1000 MG tablet Take 1 tablet (1,000 mg total) by mouth 2 (two) times a day for 7 days 3/29/21 4/5/21 Yes Alecia Brumfield MD   cephalexin [facility]) 250 mg capsule Take 2 capsules (500 mg total) by mouth every 6 (six) hours for 7 days 3/29/21 4/5/21 Yes Alecia Brumfield MD   cholecalciferol (VITAMIN D3) 1,000 units tablet Take 2 tablets (2,000 Units total) by mouth daily for 7 days 3/29/21 4/5/21 Yes Alecia Brumfield MD   ibuprofen (MOTRIN) 600 mg tablet Take 1 tablet (600 mg total) by mouth every 6 (six) hours as needed for moderate pain 7/5/19  Yes Alecia Brumfield MD   multivitamin-iron-minerals-folic acid (CENTRUM) chewable tablet Chew 1 tablet daily for 7 days 3/29/21 4/5/21 Yes Quinten Pena MD   ondansetron [facility]) 4 mg tablet Take 1 tablet (4 mg total) by mouth every 6 (six) hours 3/29/21  Yes Quinten Pena MD   Misc  Devices (Pulse Oximeter) MISC Please check your oxygen saturation every 4 hours while awake or if you experience shortness of breath 3/29/21   Quinten Pena MD   ondansetron [facility]) 4 mg tablet Take 1 tablet (4 mg total) by mouth every 8 (eight) hours as needed for nausea or vomiting 7/5/19 4/3/21  Quinten Pena MD   oxyCODONE-acetaminophen (PERCOCET) 5-325 mg per tablet Take 1 tablet by mouth every 4 (four) hours as needed for moderate pain for up to 15 dosesMax Daily Amount: 6 tablets 7/5/19 4/3/21  Quinten Pena MD     I have reviewed home medications with patient personally  Allergies: No Known Allergies    Social History:     Marital Status: /Civil Union   Patient Pre-hospital Living Situation:  Private residence  Patient Pre-hospital Level of Mobility:  Independent  Patient Pre-hospital Diet Restrictions:  None  Substance Use History:   Social History     Substance and Sexual Activity   Alcohol Use Never    Frequency: Never     Social History     Tobacco Use   Smoking Status Never Smoker   Smokeless Tobacco Never Used     Social History     Substance and Sexual Activity   Drug Use Never       Family History:    non-contributory    Physical Exam:     Vitals:   Blood Pressure: 122/60 (04/03/21 0430)  Pulse: 71 (04/03/21 0430)  Temperature: 98 9 °F (37 2 °C) (04/03/21 0330)  Temp Source: Oral (04/03/21 0330)  Respirations: (!) 24 (04/03/21 0430)  Height: 5' 5" (165 1 cm) (04/03/21 0216)  Weight - Scale: 81 2 kg (179 lb) (04/03/21 0216)  SpO2: 96 % (04/03/21 0430)    Physical Exam  Vitals signs and nursing note reviewed  Constitutional:       General: She is not in acute distress  Appearance: She is well-developed  She is ill-appearing        Comments: Patient is lying in bed, appears acutely ill  No respiratory distress, is able to speak in complete sentences without conversational dyspnea  HENT:      Head: Normocephalic and atraumatic  Eyes:      Conjunctiva/sclera: Conjunctivae normal    Neck:      Musculoskeletal: Neck supple  Cardiovascular:      Rate and Rhythm: Normal rate and regular rhythm  Heart sounds: No murmur  Pulmonary:      Effort: Pulmonary effort is normal  No respiratory distress  Breath sounds: Normal breath sounds  Abdominal:      Palpations: Abdomen is soft  Tenderness: There is generalized abdominal tenderness  There is no guarding  Musculoskeletal:      Right lower leg: No edema  Left lower leg: No edema  Skin:     General: Skin is warm and dry  Neurological:      Mental Status: She is alert and oriented to person, place, and time  Mental status is at baseline  Psychiatric:         Mood and Affect: Mood normal          Behavior: Behavior normal            Additional Data:     Lab Results: I have personally reviewed pertinent reports        Results from last 7 days   Lab Units 04/03/21  0218 03/29/21  1215   WBC Thousand/uL 10 79* 5 60   HEMOGLOBIN g/dL 15 7* 16 2*   HEMATOCRIT % 47 0* 49 5*   PLATELETS Thousands/uL 271 147*   BANDS PCT % 4  --    NEUTROS PCT %  --  57   LYMPHS PCT %  --  30   LYMPHO PCT % 15  --    MONOS PCT %  --  12   MONO PCT % 2*  --    EOS PCT % 0 0     Results from last 7 days   Lab Units 04/03/21  0218   SODIUM mmol/L 138   POTASSIUM mmol/L 3 2*   CHLORIDE mmol/L 100   CO2 mmol/L 26   BUN mg/dL 9   CREATININE mg/dL 0 86   ANION GAP mmol/L 12   CALCIUM mg/dL 8 8   ALBUMIN g/dL 2 7*   TOTAL BILIRUBIN mg/dL 0 39   ALK PHOS U/L 63   ALT U/L 30   AST U/L 29   GLUCOSE RANDOM mg/dL 274*         Results from last 7 days   Lab Units 04/03/21  0233   POC GLUCOSE mg/dl 247*         Results from last 7 days   Lab Units 04/03/21  0218   LACTIC ACID mmol/L 1 8       Imaging: I have personally reviewed pertinent reports  XR chest 1 view portable    (Results Pending)       EKG, Pathology, and Other Studies Reviewed on Admission:   · EKG:  Normal sinus rhythm, no ischemic changes    Allscripts / Epic Records Reviewed: Yes     ** Please Note: This note has been constructed using a voice recognition system   **

## 2021-04-03 NOTE — ASSESSMENT & PLAN NOTE
Patient presents with generalized abdominal pain, nausea, vomiting, and diarrhea  · In the setting of COVID-19  · LFTs within normal limits  · Supportive care with Zofran, Bentyl, aqua K-pad  · Likely sequela of COVID-19, however if abdominal pain persists or worsens can consider imaging

## 2021-04-04 PROBLEM — A04.72 C. DIFFICILE COLITIS: Status: ACTIVE | Noted: 2021-04-04

## 2021-04-04 LAB
ALBUMIN SERPL BCP-MCNC: 2.4 G/DL (ref 3.5–5)
ALP SERPL-CCNC: 60 U/L (ref 46–116)
ALT SERPL W P-5'-P-CCNC: 23 U/L (ref 12–78)
ANION GAP SERPL CALCULATED.3IONS-SCNC: 11 MMOL/L (ref 4–13)
AST SERPL W P-5'-P-CCNC: 19 U/L (ref 5–45)
BILIRUB SERPL-MCNC: 0.23 MG/DL (ref 0.2–1)
BUN SERPL-MCNC: 20 MG/DL (ref 5–25)
C DIFF TOX A+B STL QL IA: POSITIVE
C DIFF TOX B TCDB STL QL NAA+PROBE: POSITIVE
C DIFF TOX GENS STL QL NAA+PROBE: POSITIVE
CALCIUM ALBUM COR SERPL-MCNC: 10.5 MG/DL (ref 8.3–10.1)
CALCIUM SERPL-MCNC: 9.2 MG/DL (ref 8.3–10.1)
CHLORIDE SERPL-SCNC: 104 MMOL/L (ref 100–108)
CO2 SERPL-SCNC: 26 MMOL/L (ref 21–32)
CREAT SERPL-MCNC: 0.91 MG/DL (ref 0.6–1.3)
CRP SERPL QL: 93.7 MG/L
D DIMER PPP FEU-MCNC: 0.66 UG/ML FEU
ERYTHROCYTE [DISTWIDTH] IN BLOOD BY AUTOMATED COUNT: 12.7 % (ref 11.6–15.1)
GFR SERPL CREATININE-BSD FRML MDRD: 68 ML/MIN/1.73SQ M
GLUCOSE SERPL-MCNC: 348 MG/DL (ref 65–140)
HCT VFR BLD AUTO: 48.2 % (ref 34.8–46.1)
HGB BLD-MCNC: 15.9 G/DL (ref 11.5–15.4)
MCH RBC QN AUTO: 30.2 PG (ref 26.8–34.3)
MCHC RBC AUTO-ENTMCNC: 33 G/DL (ref 31.4–37.4)
MCV RBC AUTO: 92 FL (ref 82–98)
NRBC BLD AUTO-RTO: 0 /100 WBCS
PLATELET # BLD AUTO: 303 THOUSANDS/UL (ref 149–390)
PMV BLD AUTO: 10.5 FL (ref 8.9–12.7)
POTASSIUM SERPL-SCNC: 3.6 MMOL/L (ref 3.5–5.3)
PROCALCITONIN SERPL-MCNC: 1.11 NG/ML
PROT SERPL-MCNC: 6.9 G/DL (ref 6.4–8.2)
RBC # BLD AUTO: 5.26 MILLION/UL (ref 3.81–5.12)
SODIUM SERPL-SCNC: 141 MMOL/L (ref 136–145)
WBC # BLD AUTO: 10.24 THOUSAND/UL (ref 4.31–10.16)

## 2021-04-04 PROCEDURE — 85379 FIBRIN DEGRADATION QUANT: CPT | Performed by: INTERNAL MEDICINE

## 2021-04-04 PROCEDURE — 86140 C-REACTIVE PROTEIN: CPT | Performed by: INTERNAL MEDICINE

## 2021-04-04 PROCEDURE — 85027 COMPLETE CBC AUTOMATED: CPT | Performed by: INTERNAL MEDICINE

## 2021-04-04 PROCEDURE — 80053 COMPREHEN METABOLIC PANEL: CPT | Performed by: INTERNAL MEDICINE

## 2021-04-04 PROCEDURE — 84145 PROCALCITONIN (PCT): CPT | Performed by: PHYSICIAN ASSISTANT

## 2021-04-04 PROCEDURE — 99233 SBSQ HOSP IP/OBS HIGH 50: CPT | Performed by: INTERNAL MEDICINE

## 2021-04-04 RX ADMIN — Medication 2000 UNITS: at 09:47

## 2021-04-04 RX ADMIN — ENOXAPARIN SODIUM 30 MG: 30 INJECTION SUBCUTANEOUS at 21:01

## 2021-04-04 RX ADMIN — DEXAMETHASONE SODIUM PHOSPHATE 6 MG: 4 INJECTION, SOLUTION INTRA-ARTICULAR; INTRALESIONAL; INTRAMUSCULAR; INTRAVENOUS; SOFT TISSUE at 05:02

## 2021-04-04 RX ADMIN — Medication 125 MG: at 18:20

## 2021-04-04 RX ADMIN — CEFTRIAXONE SODIUM 1000 MG: 10 INJECTION, POWDER, FOR SOLUTION INTRAVENOUS at 23:34

## 2021-04-04 RX ADMIN — GUAIFENESIN 600 MG: 600 TABLET ORAL at 09:47

## 2021-04-04 RX ADMIN — REMDESIVIR 100 MG: 100 INJECTION, POWDER, LYOPHILIZED, FOR SOLUTION INTRAVENOUS at 05:02

## 2021-04-04 RX ADMIN — GUAIFENESIN 600 MG: 600 TABLET ORAL at 21:01

## 2021-04-04 RX ADMIN — ENOXAPARIN SODIUM 30 MG: 30 INJECTION SUBCUTANEOUS at 09:47

## 2021-04-04 RX ADMIN — OXYCODONE HYDROCHLORIDE AND ACETAMINOPHEN 1000 MG: 500 TABLET ORAL at 09:48

## 2021-04-04 RX ADMIN — OXYCODONE HYDROCHLORIDE AND ACETAMINOPHEN 1000 MG: 500 TABLET ORAL at 21:01

## 2021-04-04 RX ADMIN — ZINC SULFATE 220 MG (50 MG) CAPSULE 220 MG: CAPSULE at 09:47

## 2021-04-04 RX ADMIN — Medication 1 PACKET: at 09:47

## 2021-04-04 RX ADMIN — DOCUSATE SODIUM 100 MG: 100 CAPSULE, LIQUID FILLED ORAL at 09:47

## 2021-04-04 RX ADMIN — DOCUSATE SODIUM 100 MG: 100 CAPSULE, LIQUID FILLED ORAL at 18:19

## 2021-04-04 RX ADMIN — DOXYCYCLINE 100 MG: 100 CAPSULE ORAL at 05:01

## 2021-04-04 RX ADMIN — Medication 125 MG: at 23:34

## 2021-04-04 RX ADMIN — DOXYCYCLINE 100 MG: 100 CAPSULE ORAL at 18:18

## 2021-04-04 NOTE — ASSESSMENT & PLAN NOTE
Patient tested positive on 03/29, has had worsening shortness of breath, fevers, cough   Moderate COVID pathway as below    Supplemental O2 to keep O2 sat more than 90%  Currently on 6 L     CXR worsening bilateral infiltrate   Started on IV dexamethasone and IV remdesivir  Continue   Procalcitonin elevated   Continue IV antibiotics    Ceftriaxone and doxycycline   AC with Lovenox   OOB TID, ambulation and mobilization encouraged   Self proning as tolerated   Monitor CBC, CMP, inflammatory markers

## 2021-04-04 NOTE — PLAN OF CARE
Problem: Prexisting or High Potential for Compromised Skin Integrity  Goal: Skin integrity is maintained or improved  Description: INTERVENTIONS:  - Identify patients at risk for skin breakdown  - Assess and monitor skin integrity  - Assess and monitor nutrition and hydration status  - Monitor labs   - Assess for incontinence   - Turn and reposition patient  - Assist with mobility/ambulation  - Relieve pressure over bony prominences  - Avoid friction and shearing  - Provide appropriate hygiene as needed including keeping skin clean and dry  - Evaluate need for skin moisturizer/barrier cream  - Collaborate with interdisciplinary team   - Patient/family teaching  - Consider wound care consult   Outcome: Progressing     Problem: Nutrition/Hydration-ADULT  Goal: Nutrient/Hydration intake appropriate for improving, restoring or maintaining nutritional needs  Description: Monitor and assess patient's nutrition/hydration status for malnutrition  Collaborate with interdisciplinary team and initiate plan and interventions as ordered  Monitor patient's weight and dietary intake as ordered or per policy  Utilize nutrition screening tool and intervene as necessary  Determine patient's food preferences and provide high-protein, high-caloric foods as appropriate       INTERVENTIONS:  - Monitor oral intake, urinary output, labs, and treatment plans  - Assess nutrition and hydration status and recommend course of action  - Evaluate amount of meals eaten  - Assist patient with eating if necessary   - Allow adequate time for meals  - Recommend/ encourage appropriate diets, oral nutritional supplements, and vitamin/mineral supplements  - Order, calculate, and assess calorie counts as needed  - Recommend, monitor, and adjust tube feedings and TPN/PPN based on assessed needs  - Assess need for intravenous fluids  - Provide specific nutrition/hydration education as appropriate  - Include patient/family/caregiver in decisions related to nutrition  Outcome: Progressing     Problem: Potential for Falls  Goal: Patient will remain free of falls  Description: INTERVENTIONS:  - Assess patient frequently for physical needs  -  Identify cognitive and physical deficits and behaviors that affect risk of falls  -  [place] fall precautions as indicated by assessment   - Educate patient/family on patient safety including physical limitations  - Instruct patient to call for assistance with activity based on assessment  - Modify environment to reduce risk of injury  - Consider OT/PT consult to assist with strengthening/mobility  Outcome: Progressing     Problem: DISCHARGE PLANNING  Goal: Discharge to home or other facility with appropriate resources  Description: INTERVENTIONS:  - Identify barriers to discharge w/patient and caregiver  - Arrange for needed discharge resources and transportation as appropriate  - Identify discharge learning needs (meds, wound care, etc )  - Arrange for interpretive services to assist at discharge as needed  - Refer to Case Management Department for coordinating discharge planning if the patient needs post-hospital services based on physician/advanced practitioner order or complex needs related to functional status, cognitive ability, or social support system  Outcome: Progressing     Problem: Knowledge Deficit  Goal: Patient/family/caregiver demonstrates understanding of disease process, treatment plan, medications, and discharge instructions  Description: Complete learning assessment and assess knowledge base    Interventions:  - Provide teaching at level of understanding  - Provide teaching via preferred learning methods  Outcome: Progressing     Problem: RESPIRATORY - ADULT  Goal: Achieves optimal ventilation and oxygenation  Description: INTERVENTIONS:  - Assess for changes in respiratory status  - Assess for changes in mentation and behavior  - Position to facilitate oxygenation and minimize respiratory effort  - Oxygen administered by appropriate delivery if ordered  - Initiate smoking cessation education as indicated  - Encourage broncho-pulmonary hygiene including cough, deep breathe, Incentive Spirometry  - Assess the need for suctioning and aspirate as needed  - Assess and instruct to report SOB or any respiratory difficulty  - Respiratory Therapy support as indicated  Outcome: Progressing     Problem: GASTROINTESTINAL - ADULT  Goal: Minimal or absence of nausea and/or vomiting  Description: INTERVENTIONS:  - Administer IV fluids if ordered to ensure adequate hydration  - Maintain NPO status until nausea and vomiting are resolved  - Nasogastric tube if ordered  - Administer ordered antiemetic medications as needed  - Provide nonpharmacologic comfort measures as appropriate  - Advance diet as tolerated, if ordered  - Consider nutrition services referral to assist patient with adequate nutrition and appropriate food choices  Outcome: Progressing  Goal: Maintains or returns to baseline bowel function  Description: INTERVENTIONS:  - Assess bowel function  - Encourage oral fluids to ensure adequate hydration  - Administer IV fluids if ordered to ensure adequate hydration  - Administer ordered medications as needed  - Encourage mobilization and activity  - Consider nutritional services referral to assist patient with adequate nutrition and appropriate food choices  Outcome: Progressing

## 2021-04-04 NOTE — ASSESSMENT & PLAN NOTE
Patient presents with generalized abdominal pain, nausea, vomiting, and diarrhea  · In the setting of COVID-19  · LFTs within normal limits  · Her C diff also positive    · Abdominal pain and diarrhea improved now

## 2021-04-04 NOTE — ASSESSMENT & PLAN NOTE
Patient meeting sepsis criteria with tachypnea, tachycardia  In the setting of COVID-19  · Moderate COVID-19 pathway  · Procalcitonin elevated  Continue IV antibiotics also    · Blood culture no growth in 24 hours

## 2021-04-04 NOTE — PROGRESS NOTES
[address] [facility]  Progress Note Troy Aleman 1960, 63 y o  female MRN: [id number]  Unit/Bed#: -[place] Encounter: [id number]  Primary Care Provider: No primary care provider on file  Date and time admitted to hospital: 4/3/2021  2:12 AM    C  difficile colitis  Assessment & Plan  Started on oral vancomycin  Consider GI evaluation if does not resolve    Abdominal pain  Assessment & Plan  Patient presents with generalized abdominal pain, nausea, vomiting, and diarrhea  · In the setting of COVID-19  · LFTs within normal limits  · Her C diff also positive  · Abdominal pain and diarrhea improved now    Sepsis [facility])  Assessment & Plan  Patient meeting sepsis criteria with tachypnea, tachycardia  In the setting of COVID-19  · Moderate COVID-19 pathway  · Procalcitonin elevated  Continue IV antibiotics also  · Blood culture no growth in 24 hours    Acute respiratory failure (HCC)  Assessment & Plan  · In the setting of COVID-19  · Currently saturating at 94% on 6 L via nasal cannula, wean as tolerated  · Respiratory protocol  · Pulmonary evaluation appreciated    * Pneumonia due to COVID-19 virus  Assessment & Plan  Patient tested positive on 03/29, has had worsening shortness of breath, fevers, cough   Moderate COVID pathway as below    Supplemental O2 to keep O2 sat more than 90%  Currently on 6 L     CXR worsening bilateral infiltrate   Started on IV dexamethasone and IV remdesivir  Continue   Procalcitonin elevated   Continue IV antibiotics  Ceftriaxone and doxycycline   AC with Lovenox   OOB TID, ambulation and mobilization encouraged   Self proning as tolerated   Monitor CBC, CMP, inflammatory markers          VTE Pharmacologic Prophylaxis:   Pharmacologic: Enoxaparin (Lovenox)  Mechanical VTE Prophylaxis in Place: Yes    Patient Centered Rounds: I have performed bedside rounds with nursing staff today      Discussions with Specialists or Other Care Team Provider: Education and Discussions with Family / Patient:  Patient    Time Spent for Care: More than 50% of total time spent on counseling and coordination of care as described above  Current Length of Stay: 1 day(s)    Current Patient Status: Inpatient   Certification Statement: The patient will continue to require additional inpatient hospital stay due to See above    Discharge Plan:  Not clear    Code Status: Level 1 - Full Code      Subjective:   I have seen and examined the patient bedside this morning  Patient feels slightly better  Diarrhea improved  Afebrile now  No chest pain or palpitation  Shortness of breath slightly improved  However still she is requiring 6 L oxygen    Objective:     Vitals:   Temp (24hrs), Av 3 °F (36 8 °C), Min:97 6 °F (36 4 °C), Max:98 9 °F (37 2 °C)    Temp:  [97 6 °F (36 4 °C)-98 9 °F (37 2 °C)] 97 6 °F (36 4 °C)  HR:  [64-77] 70  Resp:  [18-20] 18  BP: (108-126)/(53-65) 126/59  SpO2:  [85 %-96 %] 91 %  Body mass index is 29 79 kg/m²  Input and Output Summary (last 24 hours): Intake/Output Summary (Last 24 hours) at 2021 1507  Last data filed at 2021 0300  Gross per 24 hour   Intake 120 ml   Output 0 ml   Net 120 ml       Physical Exam:     Physical Exam  Limited due to pandemic COVID-19 infection      General- Awake, alert and oriented x 3, looks comfortable  Respiratory system- breathing on 6 L oxygen, not using any accessory muscles  CVS-normal S1, S2  Psych- No acute psychosis  Skin-maculopapular rash over chest, present for a week now  Musculoskeletal-no gross deformity  CNS- moving all extremities    Additional Data:     Labs:    Results from last 7 days   Lab Units 21  0509 21  0218 21  1215   WBC Thousand/uL 10 24* 10 79* 5 60   HEMOGLOBIN g/dL 15 9* 15 7* 16 2*   HEMATOCRIT % 48 2* 47 0* 49 5*   PLATELETS Thousands/uL 303 271 147*   BANDS PCT %  --  4  --    NEUTROS PCT %  --   --  57   LYMPHS PCT %  --   --  30   LYMPHO PCT % --  15  --    MONOS PCT %  --   --  12   MONO PCT %  --  2*  --    EOS PCT %  --  0 0     Results from last 7 days   Lab Units 04/04/21  0509   SODIUM mmol/L 141   POTASSIUM mmol/L 3 6   CHLORIDE mmol/L 104   CO2 mmol/L 26   BUN mg/dL 20   CREATININE mg/dL 0 91   ANION GAP mmol/L 11   CALCIUM mg/dL 9 2   ALBUMIN g/dL 2 4*   TOTAL BILIRUBIN mg/dL 0 23   ALK PHOS U/L 60   ALT U/L 23   AST U/L 19   GLUCOSE RANDOM mg/dL 348*         Results from last 7 days   Lab Units 04/03/21  0233   POC GLUCOSE mg/dl 247*         Results from last 7 days   Lab Units 04/04/21  0509 04/03/21  0218   LACTIC ACID mmol/L  --  1 8   PROCALCITONIN ng/ml 1 11* 2 56*           * I Have Reviewed All Lab Data Listed Above  * Additional Pertinent Lab Tests Reviewed: All Labs Within Last 24 Hours Reviewed    Imaging:    Imaging Reports Reviewed Today Include:   Imaging Personally Reviewed by Myself Includes:      Recent Cultures (last 7 days):     Results from last 7 days   Lab Units 04/03/21  1228 04/03/21  0227 04/03/21  0218   BLOOD CULTURE   --  No Growth at 24 hrs  No Growth at 24 hrs     C DIFF TOXIN B BY PCR  Positive*  Positive*  --   --        Last 24 Hours Medication List:   Current Facility-Administered Medications   Medication Dose Route Frequency Provider Last Rate    acetaminophen  650 mg Oral Q6H PRN [facility] Leonardo, PA-MARTHA      ascorbic acid  1,000 mg Oral Q12H [facility] RICK Felipe-MARTHA      cefTRIAXone  1,000 mg Intravenous Q24H [facility] RICK Patterson-C 1,000 mg (04/03/21 2334)    cholecalciferol  2,000 Units Oral Daily Naeem RICK Patterson-MARTHA      dexamethasone  6 mg Intravenous Q24H [facility] RICK Patterson-MARTHA      dicyclomine  10 mg Oral 4x Daily PRN Naeem RICK Patterson-MARTHA      docusate sodium  100 mg Oral BID [facility] RICK Patterson-MARTHA      doxycycline hyclate  100 mg Oral Q12H [facility] BRITTANEY Patterson      enoxaparin  30 mg Subcutaneous Q12H [facility]      guaiFENesin  600 mg Oral Q12H [facility] Gian Valentine BRITTANEY Alvarez      lactobacillus acidophilus-bulgaricus  1 packet Oral TID With Meals Lotus Lopez MD      metoclopramide  10 mg Intravenous Q6H PRN Daphene Sox, PA-C      zinc sulfate  220 mg Oral Daily Daphene Sox, PA-C      Followed by   Iona Ramesh ON 4/10/2021] multivitamin-minerals  1 tablet Oral Daily Daphene Sox, PA-C      ondansetron  4 mg Intravenous Q6H PRN Daphene Sox, PA-C      remdesivir  100 mg Intravenous Q24H Daphene Sox, PA-MARTHA      vancomycin  125 mg Oral Q6H Go Lin MD          Today, Patient Was Seen By: Lenora Hurst MD    ** Please Note: Dictation voice to text software may have been used in the creation of this document   **

## 2021-04-04 NOTE — PLAN OF CARE
Problem: Potential for Falls  Goal: Patient will remain free of falls  Description: INTERVENTIONS:  - Assess patient frequently for physical needs  -  Identify cognitive and physical deficits and behaviors that affect risk of falls  -  [place] fall precautions as indicated by assessment   - Educate patient/family on patient safety including physical limitations  - Instruct patient to call for assistance with activity based on assessment  - Modify environment to reduce risk of injury  - Consider OT/PT consult to assist with strengthening/mobility  Outcome: Progressing     Problem: DISCHARGE PLANNING  Goal: Discharge to home or other facility with appropriate resources  Description: INTERVENTIONS:  - Identify barriers to discharge w/patient and caregiver  - Arrange for needed discharge resources and transportation as appropriate  - Identify discharge learning needs (meds, wound care, etc )  - Arrange for interpretive services to assist at discharge as needed  - Refer to Case Management Department for coordinating discharge planning if the patient needs post-hospital services based on physician/advanced practitioner order or complex needs related to functional status, cognitive ability, or social support system  Outcome: Progressing     Problem: Knowledge Deficit  Goal: Patient/family/caregiver demonstrates understanding of disease process, treatment plan, medications, and discharge instructions  Description: Complete learning assessment and assess knowledge base    Interventions:  - Provide teaching at level of understanding  - Provide teaching via preferred learning methods  Outcome: Progressing     Problem: RESPIRATORY - ADULT  Goal: Achieves optimal ventilation and oxygenation  Description: INTERVENTIONS:  - Assess for changes in respiratory status  - Assess for changes in mentation and behavior  - Position to facilitate oxygenation and minimize respiratory effort  - Oxygen administered by appropriate delivery if ordered  - Initiate smoking cessation education as indicated  - Encourage broncho-pulmonary hygiene including cough, deep breathe, Incentive Spirometry  - Assess the need for suctioning and aspirate as needed  - Assess and instruct to report SOB or any respiratory difficulty  - Respiratory Therapy support as indicated  Outcome: Progressing     Problem: GASTROINTESTINAL - ADULT  Goal: Minimal or absence of nausea and/or vomiting  Description: INTERVENTIONS:  - Administer IV fluids if ordered to ensure adequate hydration  - Maintain NPO status until nausea and vomiting are resolved  - Nasogastric tube if ordered  - Administer ordered antiemetic medications as needed  - Provide nonpharmacologic comfort measures as appropriate  - Advance diet as tolerated, if ordered  - Consider nutrition services referral to assist patient with adequate nutrition and appropriate food choices  Outcome: Progressing  Goal: Maintains or returns to baseline bowel function  Description: INTERVENTIONS:  - Assess bowel function  - Encourage oral fluids to ensure adequate hydration  - Administer IV fluids if ordered to ensure adequate hydration  - Administer ordered medications as needed  - Encourage mobilization and activity  - Consider nutritional services referral to assist patient with adequate nutrition and appropriate food choices  Outcome: Progressing

## 2021-04-04 NOTE — ASSESSMENT & PLAN NOTE
· In the setting of COVID-19  · Currently saturating at 94% on 6 L via nasal cannula, wean as tolerated  · Respiratory protocol  · Pulmonary evaluation appreciated

## 2021-04-04 NOTE — UTILIZATION REVIEW
Initial Clinical Review    Admission: Date/Time/Statement:   Admission Orders (From admission, onward)     Ordered        04/03/21 0321  Inpatient Admission  Once                   Orders Placed This Encounter   Procedures    Inpatient Admission     Standing Status:   Standing     Number of Occurrences:   1     Order Specific Question:   Level of Care     Answer:   Med Surg [16]     Order Specific Question:   Estimated length of stay     Answer:   More than 2 Midnights     Order Specific Question:   Certification     Answer:   I certify that inpatient services are medically necessary for this patient for a duration of greater than two midnights  See H&P and MD Progress Notes for additional information about the patient's course of treatment  ED Arrival Information     Expected Arrival Acuity Means of Arrival Escorted By Service Admission Type    - 4/3/2021 02:12 Emergent Ambulance [facility] Medicine Emergency    Arrival Complaint    -        Chief Complaint   Patient presents with    Weakness - Generalized     Pt presents to ED with Abd pain, n/v/d  Pt reports generalized weakness and cough  Pt covid positive     Assessment/Plan:   60y Female to ED present with  worsening shortness of breath, fever, abdominal pain, diarrhea, vomiting, and fever ongoing for 10-11 days  Tested positive for COVID on 03/29  Pt  is also unfortunately sick with COVID and is in an ICU at this time  Daughter also sick with COVID at home  In ED found to be hypoxic with sats of 70s on room air and placed on O2 6L N/C  Recently started on Keflex for UTI  Admit Inpatient level of care for Pneumonia due to COVID-19 virus, Abdominal pain, Sepsis and Acute respiratory failure  Supplemental O2 with 6L saturating 94%; wean as tolerated to maintain saturations >92%  Cbc and Cmp daily  Cardiac and inflammatory markers pending  Start Iv/PO antibiotics  Iv Steriods daily x10 days   Iv Remdesivir 200 mg IV day 1 and 100 mg IV on day 2/5  AC with Lovenox for elevated D-Dimer  Prn antiemetics  Bld cultures and procal pending  4/3 Pulmonology cons; Acute hypoxemic respiratory failure, COVID-19 pneumonia  CXR appears with worsened opacities from recent x-ray on 03/29  Continue Covid treatment  Currently titrated to 4L N/c with sats in the mids 90s to titrate to maintain O2 sats greater than 90%  Trend inflammatory markers - D-dimer 0 85, CPR pending  Continue IV steriods, Iv Remdesivir and IV antibiotics  4/4 Progress notes; C diff colitis, started on po vancomycin  Procal pending  Continue IV antibiotics  Bld cultures with no growth in 24 hrs  Continue IV steriods and Iv Remdesivir  Diarrhea improving   Now back on O2 6L N/C      ED Triage Vitals   Temperature Pulse Respirations Blood Pressure SpO2   04/03/21 0217 04/03/21 0217 04/03/21 0217 04/03/21 0217 04/03/21 0217   (!) 100 7 °F (38 2 °C) 89 (!) 30 150/70 94 %      Temp Source Heart Rate Source Patient Position - Orthostatic VS BP Location FiO2 (%)   04/03/21 0217 04/03/21 0217 04/03/21 0217 04/03/21 0217 --   Oral Monitor Sitting Left arm       Pain Score       04/03/21 0216       Worst Possible Pain          Wt Readings from Last 1 Encounters:   04/03/21 81 2 kg (179 lb)     Additional Vital Signs:   04/04/21 08:00:58  97 6 °F (36 4 °C)  70  18  126/59  81  88 %Abnormal   --  --  --  Lying   04/04/21 0125  --  --  --  --  --  --  44  6 L/min  Nasal cannula  --   04/04/21 00:05:57  98 4 °F (36 9 °C)  65  18  119/65  83  90 %  --  --  --  Lying   04/03/21 21:43:23  --  66  18  119/65  83  85 %Abnormal   --  --  --  --   04/03/21 2102  98 9 °F (37 2 °C)  65  18  123/58  --  92 %  32  3 L/min  Nasal cannula  Lying   04/03/21 2000  --  64  20  115/56  81  94 %  44  6 L/min  Nasal cannula       04/03/21 21:43:23  --  66  18  119/65  83  85 %Abnormal   --  --  --  --   04/03/21 2102  98 9 °F (37 2 °C)  65  18  123/58  --  92 %  32  3 L/min  Nasal cannula  Lying   04/03/21 2000 --  64  20  115/56  81  94 %  44  6 L/min  Nasal cannula  Lying   04/03/21 1845  --  69  20  --  --  92 %  44  6 L/min  Nasal cannula  Sitting   04/03/21 1800  --  68  20  115/58  83  94 %  44  6 L/min  Nasal cannula  Sitting   04/03/21 1700  --  77  20  117/60  82  96 %  44  6 L/min  Nasal cannula         Pertinent Labs/Diagnostic Test Results:   4/3 PCXR - Worsening bilateral multi lobar groundglass infiltrates      Results from last 7 days   Lab Units 03/29/21  1600   SARS-COV-2  Positive*     Lab Units 04/04/21  0509 04/03/21  0218   WBC Thousand/uL 10 24* 10 79*   HEMOGLOBIN g/dL 15 9* 15 7*   HEMATOCRIT % 48 2* 47 0*   PLATELETS Thousands/uL 303 271   NEUTROS ABS Thousands/µL  --   --    BANDS PCT %  --  4         Lab Units 04/04/21  0509 04/03/21  0218   SODIUM mmol/L 141 138   POTASSIUM mmol/L 3 6 3 2*   CHLORIDE mmol/L 104 100   CO2 mmol/L 26 26   ANION GAP mmol/L 11 12   BUN mg/dL 20 9   CREATININE mg/dL 0 91 0 86   EGFR ml/min/1 73sq m 68 73   CALCIUM mg/dL 9 2 8 8     Lab Units 04/04/21  0509 04/03/21  0218   AST U/L 19 29   ALT U/L 23 30   ALK PHOS U/L 60 63   TOTAL PROTEIN g/dL 6 9 7 1   ALBUMIN g/dL 2 4* 2 7*   TOTAL BILIRUBIN mg/dL 0 23 0 39   BILIRUBIN DIRECT mg/dL  --  0 13     Results from last 7 days   Lab Units 04/03/21  0233   POC GLUCOSE mg/dl 247*     Lab Units 04/04/21  0509 04/03/21  0218   GLUCOSE RANDOM mg/dL 348* 274*       Results from last 7 days   Lab Units 04/03/21  0218   TROPONIN I ng/mL <0 02     Results from last 7 days   Lab Units 04/04/21  0509 04/03/21  0218   D-DIMER QUANTITATIVE ug/ml FEU 0 66* 0 85*             Results from last 7 days   Lab Units 04/04/21  0509 04/03/21  0218   PROCALCITONIN ng/ml 1 11* 2 56*     Results from last 7 days   Lab Units 04/03/21  0218   LACTIC ACID mmol/L 1 8     Results from last 7 days   Lab Units 03/29/21  1215   LIPASE u/L 292     Results from last 7 days   Lab Units 04/04/21  0509   CRP mg/L 93 7*     Results from last 7 days   Lab Units 04/03/21  1228   C DIFF TOXIN B BY PCR  Positive*  Positive*       Results from last 7 days   Lab Units 04/03/21  0227 04/03/21  0218   BLOOD CULTURE  No Growth at 24 hrs  No Growth at 24 hrs       Results from last 7 days   Lab Units 04/03/21  0218   TOTAL COUNTED  100       ED Treatment:   Medication Administration from 04/03/2021 0212 to 04/03/2021 2122       Date/Time Order Dose Route Action     04/03/2021 0233 ondansetron (ZOFRAN) injection 4 mg 4 mg Intravenous Given     04/03/2021 0234 ketorolac (TORADOL) injection 30 mg 30 mg Intravenous Given     04/03/2021 0333 dicyclomine (BENTYL) tablet 20 mg 20 mg Oral Given     04/03/2021 0335 acetaminophen (TYLENOL) tablet 650 mg 650 mg Oral Given     04/03/2021 0236 ceftriaxone (ROCEPHIN) 1 g/50 mL in dextrose IVPB 1,000 mg Intravenous New Bag     04/03/2021 0336 potassium chloride (K-DUR,KLOR-CON) CR tablet 40 mEq 40 mEq Oral Given     04/03/2021 0338 melatonin tablet 6 mg 6 mg Oral Given     04/03/2021 0840 cholecalciferol (VITAMIN D3) tablet 2,000 Units 2,000 Units Oral Given     04/03/2021 1642 doxycycline hyclate (VIBRAMYCIN) capsule 100 mg 100 mg Oral Given     04/03/2021 0448 doxycycline hyclate (VIBRAMYCIN) capsule 100 mg 100 mg Oral Given     04/03/2021 0841 ascorbic acid (VITAMIN C) tablet 1,000 mg 1,000 mg Oral Given     04/03/2021 0842 zinc sulfate (ZINCATE) capsule 220 mg 220 mg Oral Given     04/03/2021 0449 dexamethasone (DECADRON) injection 6 mg 6 mg Intravenous Given     04/03/2021 0838 enoxaparin (LOVENOX) subcutaneous injection 30 mg 30 mg Subcutaneous Given     04/03/2021 0451 remdesivir (Veklury) 200 mg in sodium chloride 0 9 % 250 mL IVPB 200 mg Intravenous New Bag     04/03/2021 0840 docusate sodium (COLACE) capsule 100 mg 100 mg Oral Given     04/03/2021 0844 guaiFENesin (MUCINEX) 12 hr tablet 600 mg 600 mg Oral Given     04/03/2021 0939 dicyclomine (BENTYL) capsule 10 mg 10 mg Oral Given     04/03/2021 0937 ondansetron (ZOFRAN) injection 4 mg 4 mg Intravenous Given     04/03/2021 1642 lactobacillus acidophilus-bulgaricus [facility]) packet 1 packet 7 packet Oral Given     04/03/2021 0949 lactobacillus acidophilus-bulgaricus [facility]) packet 1 packet 1 packet Oral Given     04/03/2021 1543 loperamide (IMODIUM) capsule 2 mg 2 mg Oral Given        History reviewed  No pertinent past medical history  Present on Admission:   C  difficile colitis      Admitting Diagnosis: Weakness generalized [R53 1]  Weakness [R53 1]  Hypoxia [R09 02]  Acute respiratory failure with hypoxia (HCC) [J96 01]  COVID-19 virus infection [U07 1]  Pneumonia due to COVID-19 virus [U07 1, J12 82]  Age/Sex: 63 y o  female     Admission Orders:  Scheduled Medications:  ascorbic acid, 1,000 mg, Oral, Q12H FRANCA  cefTRIAXone, 1,000 mg, Intravenous, Q24H  cholecalciferol, 2,000 Units, Oral, Daily  dexamethasone, 6 mg, Intravenous, Q24H  docusate sodium, 100 mg, Oral, BID  doxycycline hyclate, 100 mg, Oral, Q12H  enoxaparin, 30 mg, Subcutaneous, Q12H FRANCA  guaiFENesin, 600 mg, Oral, Q12H [facility]  lactobacillus acidophilus-bulgaricus, 1 packet, Oral, TID With Meals  zinc sulfate, 220 mg, Oral, Daily    Followed by  Ese Gamboa ON 4/10/2021] multivitamin-minerals, 1 tablet, Oral, Daily  remdesivir, 100 mg, Intravenous, Q24H  vancomycin, 125 mg, Oral, Q6H [facility]      Continuous IV Infusions: None     PRN Meds:  acetaminophen, 650 mg, Oral, Q6H PRN 4/3 x1  dicyclomine, 10 mg, Oral, 4x Daily PRN  metoclopramide, 10 mg, Intravenous, Q6H PRN  ondansetron, 4 mg, Intravenous, Q6H PRN      Bld culture x2  IP CONSULT TO PULMONOLOGY    Network Utilization Review Department  ATTENTION: Please call with any questions or concerns to [phone] and carefully listen to the prompts so that you are directed to the right person   All voicemails are confidential   Raymon Astudillo all requests for admission clinical reviews, approved or denied determinations and any other requests to dedicated fax number below belonging to the campus where the patient is receiving treatment   List of dedicated fax numbers for the Facilities:  [address] DENIALS (Administrative/Medical Necessity) [phone]   [address]  (Maternity/NICU/Pediatrics) [phone] [address] Deondre Alexis [address] (  Ian Kevin "Thelma" 103) [address] [place] [address] [address] [place] [address] [phone]

## 2021-04-05 PROBLEM — R73.9 ELEVATED BLOOD SUGAR: Status: ACTIVE | Noted: 2021-04-05

## 2021-04-05 LAB
ALBUMIN SERPL BCP-MCNC: 2.3 G/DL (ref 3.5–5)
ALP SERPL-CCNC: 60 U/L (ref 46–116)
ALT SERPL W P-5'-P-CCNC: 20 U/L (ref 12–78)
ANION GAP SERPL CALCULATED.3IONS-SCNC: 10 MMOL/L (ref 4–13)
ANION GAP SERPL CALCULATED.3IONS-SCNC: 10 MMOL/L (ref 4–13)
AST SERPL W P-5'-P-CCNC: 18 U/L (ref 5–45)
ATRIAL RATE: 81 BPM
BILIRUB SERPL-MCNC: 0.31 MG/DL (ref 0.2–1)
BUN SERPL-MCNC: 23 MG/DL (ref 5–25)
BUN SERPL-MCNC: 24 MG/DL (ref 5–25)
CALCIUM ALBUM COR SERPL-MCNC: 10.6 MG/DL (ref 8.3–10.1)
CALCIUM SERPL-MCNC: 9.2 MG/DL (ref 8.3–10.1)
CALCIUM SERPL-MCNC: 9.5 MG/DL (ref 8.3–10.1)
CHLORIDE SERPL-SCNC: 103 MMOL/L (ref 100–108)
CHLORIDE SERPL-SCNC: 107 MMOL/L (ref 100–108)
CO2 SERPL-SCNC: 27 MMOL/L (ref 21–32)
CO2 SERPL-SCNC: 27 MMOL/L (ref 21–32)
CREAT SERPL-MCNC: 0.91 MG/DL (ref 0.6–1.3)
CREAT SERPL-MCNC: 1.11 MG/DL (ref 0.6–1.3)
CRP SERPL QL: 48.1 MG/L
D DIMER PPP FEU-MCNC: 0.4 UG/ML FEU
ERYTHROCYTE [DISTWIDTH] IN BLOOD BY AUTOMATED COUNT: 12.9 % (ref 11.6–15.1)
GFR SERPL CREATININE-BSD FRML MDRD: 54 ML/MIN/1.73SQ M
GFR SERPL CREATININE-BSD FRML MDRD: 68 ML/MIN/1.73SQ M
GLUCOSE SERPL-MCNC: 370 MG/DL (ref 65–140)
GLUCOSE SERPL-MCNC: 374 MG/DL (ref 65–140)
GLUCOSE SERPL-MCNC: 448 MG/DL (ref 65–140)
GLUCOSE SERPL-MCNC: 465 MG/DL (ref 65–140)
GLUCOSE SERPL-MCNC: 508 MG/DL (ref 65–140)
GLUCOSE SERPL-MCNC: >500 MG/DL (ref 65–140)
HCT VFR BLD AUTO: 46.3 % (ref 34.8–46.1)
HGB BLD-MCNC: 15.3 G/DL (ref 11.5–15.4)
MCH RBC QN AUTO: 30.7 PG (ref 26.8–34.3)
MCHC RBC AUTO-ENTMCNC: 33 G/DL (ref 31.4–37.4)
MCV RBC AUTO: 93 FL (ref 82–98)
NRBC BLD AUTO-RTO: 0 /100 WBCS
P AXIS: 40 DEGREES
PLATELET # BLD AUTO: 332 THOUSANDS/UL (ref 149–390)
PMV BLD AUTO: 10.5 FL (ref 8.9–12.7)
POTASSIUM SERPL-SCNC: 3.6 MMOL/L (ref 3.5–5.3)
POTASSIUM SERPL-SCNC: 4.2 MMOL/L (ref 3.5–5.3)
PR INTERVAL: 134 MS
PROT SERPL-MCNC: 6.4 G/DL (ref 6.4–8.2)
QRS AXIS: 75 DEGREES
QRSD INTERVAL: 82 MS
QT INTERVAL: 362 MS
QTC INTERVAL: 420 MS
RBC # BLD AUTO: 4.98 MILLION/UL (ref 3.81–5.12)
SODIUM SERPL-SCNC: 140 MMOL/L (ref 136–145)
SODIUM SERPL-SCNC: 144 MMOL/L (ref 136–145)
T WAVE AXIS: 57 DEGREES
VENTRICULAR RATE: 81 BPM
WBC # BLD AUTO: 11.43 THOUSAND/UL (ref 4.31–10.16)

## 2021-04-05 PROCEDURE — 86140 C-REACTIVE PROTEIN: CPT | Performed by: INTERNAL MEDICINE

## 2021-04-05 PROCEDURE — 99232 SBSQ HOSP IP/OBS MODERATE 35: CPT | Performed by: INTERNAL MEDICINE

## 2021-04-05 PROCEDURE — 80048 BASIC METABOLIC PNL TOTAL CA: CPT | Performed by: INTERNAL MEDICINE

## 2021-04-05 PROCEDURE — 85379 FIBRIN DEGRADATION QUANT: CPT | Performed by: INTERNAL MEDICINE

## 2021-04-05 PROCEDURE — 82948 REAGENT STRIP/BLOOD GLUCOSE: CPT

## 2021-04-05 PROCEDURE — 85027 COMPLETE CBC AUTOMATED: CPT | Performed by: INTERNAL MEDICINE

## 2021-04-05 PROCEDURE — 99232 SBSQ HOSP IP/OBS MODERATE 35: CPT | Performed by: PHYSICIAN ASSISTANT

## 2021-04-05 PROCEDURE — 93010 ELECTROCARDIOGRAM REPORT: CPT | Performed by: INTERNAL MEDICINE

## 2021-04-05 PROCEDURE — 83036 HEMOGLOBIN GLYCOSYLATED A1C: CPT | Performed by: INTERNAL MEDICINE

## 2021-04-05 PROCEDURE — 80053 COMPREHEN METABOLIC PANEL: CPT | Performed by: INTERNAL MEDICINE

## 2021-04-05 RX ADMIN — Medication 2000 UNITS: at 09:46

## 2021-04-05 RX ADMIN — Medication 125 MG: at 17:41

## 2021-04-05 RX ADMIN — OXYCODONE HYDROCHLORIDE AND ACETAMINOPHEN 1000 MG: 500 TABLET ORAL at 09:46

## 2021-04-05 RX ADMIN — DOXYCYCLINE 100 MG: 100 CAPSULE ORAL at 17:41

## 2021-04-05 RX ADMIN — GUAIFENESIN 600 MG: 600 TABLET ORAL at 20:05

## 2021-04-05 RX ADMIN — GUAIFENESIN 600 MG: 600 TABLET ORAL at 09:46

## 2021-04-05 RX ADMIN — DOXYCYCLINE 100 MG: 100 CAPSULE ORAL at 05:39

## 2021-04-05 RX ADMIN — INSULIN HUMAN 10 UNITS: 100 INJECTION, SOLUTION PARENTERAL at 17:44

## 2021-04-05 RX ADMIN — Medication 125 MG: at 14:18

## 2021-04-05 RX ADMIN — OXYCODONE HYDROCHLORIDE AND ACETAMINOPHEN 1000 MG: 500 TABLET ORAL at 20:05

## 2021-04-05 RX ADMIN — DEXAMETHASONE SODIUM PHOSPHATE 6 MG: 4 INJECTION, SOLUTION INTRA-ARTICULAR; INTRALESIONAL; INTRAMUSCULAR; INTRAVENOUS; SOFT TISSUE at 05:39

## 2021-04-05 RX ADMIN — CEFTRIAXONE SODIUM 1000 MG: 10 INJECTION, POWDER, FOR SOLUTION INTRAVENOUS at 22:20

## 2021-04-05 RX ADMIN — Medication 1 PACKET: at 14:17

## 2021-04-05 RX ADMIN — Medication 125 MG: at 05:41

## 2021-04-05 RX ADMIN — ENOXAPARIN SODIUM 30 MG: 30 INJECTION SUBCUTANEOUS at 09:48

## 2021-04-05 RX ADMIN — ZINC SULFATE 220 MG (50 MG) CAPSULE 220 MG: CAPSULE at 09:47

## 2021-04-05 RX ADMIN — INSULIN LISPRO 10 UNITS: 100 INJECTION, SOLUTION INTRAVENOUS; SUBCUTANEOUS at 22:18

## 2021-04-05 RX ADMIN — ENOXAPARIN SODIUM 30 MG: 30 INJECTION SUBCUTANEOUS at 20:05

## 2021-04-05 RX ADMIN — INSULIN LISPRO 12 UNITS: 100 INJECTION, SOLUTION INTRAVENOUS; SUBCUTANEOUS at 18:55

## 2021-04-05 RX ADMIN — Medication 1 PACKET: at 09:42

## 2021-04-05 RX ADMIN — REMDESIVIR 100 MG: 100 INJECTION, POWDER, LYOPHILIZED, FOR SOLUTION INTRAVENOUS at 05:41

## 2021-04-05 RX ADMIN — Medication 1 PACKET: at 17:43

## 2021-04-05 NOTE — PROGRESS NOTES
Progress Note - Pulmonary   Orlene Rayne 63 y o  female MRN: [id number]  Unit/Bed#: -01 Encounter: [id number]    Assessment:  Acute hypoxemic respiratory failure  COVID-19 pneumonia  C diff colitis    Plan:  Patient requiring 5-6 L oxygen  Continue to monitor SpO2 and titrate to maintain saturations greater than 89%  Discussed with nursing may get more accurate readings with nail polish removal   Continue treatment per moderate pathway  Vitamin-C, D, zinc, multivitamin  Atorvastatin, famotidine  Dexamethasone 6 mg IV x 10 days can consider increasing to 0 1 mg/kg if oxygenation does not improve over the next 24 hours  Remdesivir x5 days  Convalescent plasma: out of window  Anticoagulation: per protocol  Antibiotics: ceftriaxone and doxycycline  Encourage self-proning, activity as tolerated, incentive spirometry  Ongoing monitoring of inflammatory markers and D-dimer  Follow-up on BNP as well    Discussed with RN    Subjective:   She said her breathing is getting better but still having RAY  Abdominal pain is improving  12 point review of systems otherwise negative  Objective:     Vitals: Blood pressure 140/75, pulse 61, temperature 97 5 °F (36 4 °C), resp  rate 17, height 5' 5" (1 651 m), weight 81 2 kg (179 lb), SpO2 91 %  ,Body mass index is 29 79 kg/m²  Intake/Output Summary (Last 24 hours) at 4/5/2021 1414  Last data filed at 4/5/2021 4812  Gross per 24 hour   Intake 360 ml   Output 600 ml   Net -240 ml       Invasive Devices     Peripheral Intravenous Line            Peripheral IV 04/03/21 Right Hand 2 days                Physical Exam:  General appearance: alert and oriented, in no acute distress  Head: Normocephalic, without obvious abnormality, atraumatic  Eyes: EOMI  No discharge bilaterally  No scleral icterus     Neck: supple, symmetrical, trachea midline  Lungs: Crackles noted at the bases bilaterally  Heart: regular rate and rhythm, S1, S2 normal, no murmur, click, rub or gallop  Abdomen:  No appreciable distension or tenderness  Extremities: No edema or tenderness  Skin: No lesions or pallor noted  No rash  Neurologic: Grossly normal     Labs: I have personally reviewed pertinent lab results  Imaging and other studies: I have personally reviewed pertinent reports

## 2021-04-05 NOTE — UTILIZATION REVIEW
Notification of Inpatient Admission/Inpatient Authorization Request   This is a Notification of Inpatient Admission for [address]  Be advised that this patient was admitted to our facility under Inpatient Status  Contact Raji Rice at [phone] for additional admission information  [facility] DEPT DEDICATED Elza Louis [phone]  Patient Name:   Danette Ramirez   YOB: 1960       [address]   [address]:   [address]   Tax ID: [id number]  NPI: [id number] Attending Provider/NPI:  Phone:  Address: Katina Jalloh, Guilherme Silvestre [[id number]]  [phone]  Same as Facility   Place of Service Code: 24     Place of Service Name:  [facility]   Start Date: 4/3/21 0321 Discharge Date & Time: No discharge date for patient encounter  Type of Admission: Inpatient Status Discharge Disposition   (if discharged): Home/Self Care   Patient Diagnoses: Weakness generalized [R53 1]  Weakness [R53 1]  Hypoxia [R09 02]  Acute respiratory failure with hypoxia ([facility] [address] ) [J96 01]  COVID-19 virus infection [U07 1]  Pneumonia due to COVID-19 virus [U07 1, J12 82]     Orders: Admission Orders (From admission, onward)     Ordered        04/03/21 0321  Inpatient Admission  Once                    Assigned Utilization Review Contact: Raji Rice  Utilization   Network Utilization Review Department  Phone: [phone]; Fax [phone]  Email: [email or web address]   ATTENTION PAYERS: Please call the assigned Utilization  directly with any questions or concerns ALL voicemails in the department are confidential  Send all requests for admission clinical reviews, approved or denied determinations and any other requests to dedicated fax number belonging to the campus where the patient is receiving treatment

## 2021-04-05 NOTE — ASSESSMENT & PLAN NOTE
Patient tested positive on 03/29, has had worsening shortness of breath, fevers, cough   Moderate COVID pathway as below    Supplemental O2 to keep O2 sat more than 90%  Currently on 5 L   CXR worsening bilateral infiltrate   Started on IV dexamethasone and IV remdesivir  Continue   Procalcitonin elevated   Continue IV antibiotics    Ceftriaxone and doxycycline   AC with Lovenox   OOB TID, ambulation and mobilization encouraged   Self proning as tolerated   Monitor CBC, CMP, inflammatory markers

## 2021-04-05 NOTE — PROGRESS NOTES
[address] [facility]  Progress Note Sherlyn Dougherty 1960, 63 y o  female MRN: [id number]  Unit/Bed#: -01 Encounter: [id number]  Primary Care Provider: No primary care provider on file  Date and time admitted to hospital: 4/3/2021  2:12 AM    Elevated blood sugar  Assessment & Plan  Likely from steroid  Will check hemoglobin A1c  Insulin sliding scale    C  difficile colitis  Assessment & Plan  Started on oral vancomycin  No previous history  Will continue for 10 days total    Abdominal pain  Assessment & Plan  Patient presents with generalized abdominal pain, nausea, vomiting, and diarrhea  · In the setting of COVID-19  · LFTs within normal limits  · Her C diff also positive  · Abdominal pain and diarrhea improved now    Sepsis [facility])  Assessment & Plan  Patient meeting sepsis criteria with tachypnea, tachycardia  In the setting of COVID-19  · Moderate COVID-19 pathway  · Procalcitonin elevated  Continue IV antibiotics also  · Blood culture no growth in 24 hours    Acute respiratory failure (HCC)  Assessment & Plan  · In the setting of COVID-19  · Currently saturating at 94% on 5 L via nasal cannula, wean as tolerated  · Respiratory protocol  · Pulmonary evaluation appreciated    * Pneumonia due to COVID-19 virus  Assessment & Plan  Patient tested positive on 03/29, has had worsening shortness of breath, fevers, cough   Moderate COVID pathway as below    Supplemental O2 to keep O2 sat more than 90%  Currently on 5 L   CXR worsening bilateral infiltrate   Started on IV dexamethasone and IV remdesivir  Continue   Procalcitonin elevated   Continue IV antibiotics    Ceftriaxone and doxycycline   AC with Lovenox   OOB TID, ambulation and mobilization encouraged   Self proning as tolerated   Monitor CBC, CMP, inflammatory markers        VTE Pharmacologic Prophylaxis:   Pharmacologic:  Lovenox  Mechanical VTE Prophylaxis in Place: Yes    Patient Centered Rounds: I have performed bedside rounds with nursing staff today  Discussions with Specialists or Other Care Team Provider:  Case management    Education and Discussions with Family / Patient:  Patient    Time Spent for Care: Chase Baudilio More than 50% of total time spent on counseling and coordination of care as described above  Current Length of Stay: 2 day(s)    Current Patient Status: Inpatient   Certification Statement: The patient will continue to require additional inpatient hospital stay due to See above    Discharge Plan:  Not clear    Code Status: Level 1 - Full Code      Subjective:   I have seen and examined the patient bedside this morning  Patient feels better today  Afebrile overnight  Diarrhea also improving  No abdominal pain, nausea or vomiting  Currently on 5 L oxygen    Objective:     Vitals:   Temp (24hrs), Av 8 °F (36 6 °C), Min:97 5 °F (36 4 °C), Max:98 1 °F (36 7 °C)    Temp:  [97 5 °F (36 4 °C)-98 1 °F (36 7 °C)] 97 5 °F (36 4 °C)  HR:  [61-68] 61  Resp:  [16-18] 17  BP: (120-145)/(64-75) 140/75  SpO2:  [89 %-91 %] 91 %  Body mass index is 29 79 kg/m²  Input and Output Summary (last 24 hours): Intake/Output Summary (Last 24 hours) at 2021 1331  Last data filed at 2021 0837  Gross per 24 hour   Intake 360 ml   Output 600 ml   Net -240 ml       Physical Exam:     Physical Exam  Limited due to pandemic COVID-19 infection      General- Awake, alert and oriented x 3, looks comfortable  Respiratory system- breathing on 5 L oxygen, not using any accessory muscles  CVS-normal S1, S2  Psych- No acute psychosis  CNS- moving all extremities    Additional Data:     Labs:    Results from last 7 days   Lab Units 21  0551  21  0218   WBC Thousand/uL 11 43*   < > 10 79*   HEMOGLOBIN g/dL 15 3   < > 15 7*   HEMATOCRIT % 46 3*   < > 47 0*   PLATELETS Thousands/uL 332   < > 271   BANDS PCT %  --   --  4   LYMPHO PCT %  --   --  15   MONO PCT %  --   --  2*   EOS PCT %  --   --  0    < > = values in this interval not displayed  Results from last 7 days   Lab Units 04/05/21  0552   SODIUM mmol/L 144   POTASSIUM mmol/L 3 6   CHLORIDE mmol/L 107   CO2 mmol/L 27   BUN mg/dL 23   CREATININE mg/dL 0 91   ANION GAP mmol/L 10   CALCIUM mg/dL 9 2   ALBUMIN g/dL 2 3*   TOTAL BILIRUBIN mg/dL 0 31   ALK PHOS U/L 60   ALT U/L 20   AST U/L 18   GLUCOSE RANDOM mg/dL 370*         Results from last 7 days   Lab Units 04/03/21  0233   POC GLUCOSE mg/dl 247*         Results from last 7 days   Lab Units 04/04/21  0509 04/03/21  0218   LACTIC ACID mmol/L  --  1 8   PROCALCITONIN ng/ml 1 11* 2 56*           * I Have Reviewed All Lab Data Listed Above  * Additional Pertinent Lab Tests Reviewed: All Labs Within Last 24 Hours Reviewed    Imaging:    Imaging Reports Reviewed Today Include:   Imaging Personally Reviewed by Myself Includes:      Recent Cultures (last 7 days):     Results from last 7 days   Lab Units 04/03/21  1228 04/03/21  0227 04/03/21  0218   BLOOD CULTURE   --  No Growth at 48 hrs  No Growth at 48 hrs     C DIFF TOXIN B BY PCR  Positive*  Positive*  --   --        Last 24 Hours Medication List:   Current Facility-Administered Medications   Medication Dose Route Frequency Provider Last Rate    acetaminophen  650 mg Oral Q6H PRN Katiana Evans PA-C      ascorbic acid  1,000 mg Oral Q12H [address] Katianajoon Evans PA-C      cefTRIAXone  1,000 mg Intravenous Q24H Katiana Evans PA-C 1,000 mg (04/04/21 2334)    cholecalciferol  2,000 Units Oral Daily Katiana Evans PA-C      dexamethasone  6 mg Intravenous Q24H Katiana Evans PA-C      dicyclomine  10 mg Oral 4x Daily PRN Katiana Evans PA-C      docusate sodium  100 mg Oral BID Katiana Evans PA-C      doxycycline hyclate  100 mg Oral Q12H Katiana Evans PA-C      enoxaparin  30 mg Subcutaneous Q12H [address] Jackelin Ellis      guaiFENesin  600 mg Oral Q12H [address] Katianajoon Evans PA-C      insulin lispro  1-6 Units Subcutaneous TID AC Brandy Silva MD      insulin lispro  1-6 Units Subcutaneous HS Brandy Silva MD      lactobacillus acidophilus-bulgaricus  1 packet Oral TID With Meals Brandy Silva MD      metoclopramide  10 mg Intravenous Q6H PRN Casper Morris PA-C      zinc sulfate  220 mg Oral Daily Casper Morris PA-C      Followed by   Margaret Finney ON 4/10/2021] multivitamin-minerals  1 tablet Oral Daily Munade BRITTANEY Morris      ondansetron  4 mg Intravenous Q6H PRN Munade BRITTANEY Morris      remdesivir  100 mg Intravenous Q24H Casper Morris PA-C      vancomycin  125 mg Oral Q6H Philomena Coley MD          Today, Patient Was Seen By: Jocelyn Mondragon MD    ** Please Note: Dictation voice to text software may have been used in the creation of this document   **

## 2021-04-05 NOTE — ASSESSMENT & PLAN NOTE
· In the setting of COVID-19  · Currently saturating at 94% on 5 L via nasal cannula, wean as tolerated  · Respiratory protocol  · Pulmonary evaluation appreciated

## 2021-04-05 NOTE — CASE MANAGEMENT
CM spoke to pt via phone-She is Covid +  Pt lives with her  Manny Rosenberg and daughter Jeovanny Class in a bilevel house with 5 ZAINAB and then an additional 10 steps to reach the main level  She is able to navigate steps, but it has been causing SOB since her Covid diagnosis  She uses no DME's  She has never been in rehab or [facility]  Denies substance abuse, tobacco abuse, or mental health issues  Dr Baylee Lambert in [place] is her PCP  She used [address] in [facility] and has no problem with co-pays  She does not have a POA or Advanced Directive and does not want info at this time  She works and drives  Her daughter Jeovanny Class will transport home when she is medically cleared  CM discussed d/c needs including HH, but pt feels she has adequate support in the home with her daughter's assistance  CM will continue to follow through hospitalization   CM reviewed discharge planning process including the following: identifying help at home, patient preference for discharge planning needs, pharmacy preference, and availability of treatment team to discuss questions or concerns patient and/or family may have regarding understanding medications and recognizing signs and symptoms once discharged  CM also encouraged patient to follow up with all recommended appointments after discharge  Patient advised of importance for patient and family to participate in managing patients medical well being

## 2021-04-06 LAB
ALBUMIN SERPL BCP-MCNC: 2.5 G/DL (ref 3.5–5)
ALP SERPL-CCNC: 60 U/L (ref 46–116)
ALT SERPL W P-5'-P-CCNC: 24 U/L (ref 12–78)
ANION GAP SERPL CALCULATED.3IONS-SCNC: 11 MMOL/L (ref 4–13)
AST SERPL W P-5'-P-CCNC: 20 U/L (ref 5–45)
BASOPHILS # BLD MANUAL: 0 THOUSAND/UL (ref 0–0.1)
BASOPHILS NFR MAR MANUAL: 0 % (ref 0–1)
BILIRUB SERPL-MCNC: 0.33 MG/DL (ref 0.2–1)
BUN SERPL-MCNC: 24 MG/DL (ref 5–25)
CALCIUM ALBUM COR SERPL-MCNC: 10.4 MG/DL (ref 8.3–10.1)
CALCIUM SERPL-MCNC: 9.2 MG/DL (ref 8.3–10.1)
CHLORIDE SERPL-SCNC: 106 MMOL/L (ref 100–108)
CO2 SERPL-SCNC: 29 MMOL/L (ref 21–32)
CREAT SERPL-MCNC: 0.75 MG/DL (ref 0.6–1.3)
CRP SERPL QL: 32.4 MG/L
EOSINOPHIL # BLD MANUAL: 0 THOUSAND/UL (ref 0–0.4)
EOSINOPHIL NFR BLD MANUAL: 0 % (ref 0–6)
ERYTHROCYTE [DISTWIDTH] IN BLOOD BY AUTOMATED COUNT: 12.7 % (ref 11.6–15.1)
EST. AVERAGE GLUCOSE BLD GHB EST-MCNC: 286 MG/DL
FERRITIN SERPL-MCNC: 1242 NG/ML (ref 8–388)
GFR SERPL CREATININE-BSD FRML MDRD: 86 ML/MIN/1.73SQ M
GLUCOSE SERPL-MCNC: 147 MG/DL (ref 65–140)
GLUCOSE SERPL-MCNC: 331 MG/DL (ref 65–140)
GLUCOSE SERPL-MCNC: 335 MG/DL (ref 65–140)
GLUCOSE SERPL-MCNC: 362 MG/DL (ref 65–140)
GLUCOSE SERPL-MCNC: 83 MG/DL (ref 65–140)
HBA1C MFR BLD: 11.6 %
HCT VFR BLD AUTO: 46 % (ref 34.8–46.1)
HGB BLD-MCNC: 14.9 G/DL (ref 11.5–15.4)
LYMPHOCYTES # BLD AUTO: 31 % (ref 14–44)
LYMPHOCYTES # BLD AUTO: 4.14 THOUSAND/UL (ref 0.6–4.47)
MCH RBC QN AUTO: 30 PG (ref 26.8–34.3)
MCHC RBC AUTO-ENTMCNC: 32.4 G/DL (ref 31.4–37.4)
MCV RBC AUTO: 93 FL (ref 82–98)
MONOCYTES # BLD AUTO: 0.53 THOUSAND/UL (ref 0–1.22)
MONOCYTES NFR BLD: 4 % (ref 4–12)
NEUTROPHILS # BLD MANUAL: 8.54 THOUSAND/UL (ref 1.85–7.62)
NEUTS BAND NFR BLD MANUAL: 1 % (ref 0–8)
NEUTS SEG NFR BLD AUTO: 63 % (ref 43–75)
NRBC BLD AUTO-RTO: 0 /100 WBCS
NT-PROBNP SERPL-MCNC: 317 PG/ML
PLATELET # BLD AUTO: 434 THOUSANDS/UL (ref 149–390)
PLATELET BLD QL SMEAR: ABNORMAL
PMV BLD AUTO: 10.2 FL (ref 8.9–12.7)
POTASSIUM SERPL-SCNC: 3.4 MMOL/L (ref 3.5–5.3)
PROT SERPL-MCNC: 6.6 G/DL (ref 6.4–8.2)
RBC # BLD AUTO: 4.97 MILLION/UL (ref 3.81–5.12)
SODIUM SERPL-SCNC: 146 MMOL/L (ref 136–145)
TOTAL CELLS COUNTED SPEC: 100
VARIANT LYMPHS # BLD AUTO: 1 %
WBC # BLD AUTO: 13.34 THOUSAND/UL (ref 4.31–10.16)

## 2021-04-06 PROCEDURE — 82728 ASSAY OF FERRITIN: CPT | Performed by: PHYSICIAN ASSISTANT

## 2021-04-06 PROCEDURE — 99232 SBSQ HOSP IP/OBS MODERATE 35: CPT | Performed by: PHYSICIAN ASSISTANT

## 2021-04-06 PROCEDURE — 82948 REAGENT STRIP/BLOOD GLUCOSE: CPT

## 2021-04-06 PROCEDURE — 99233 SBSQ HOSP IP/OBS HIGH 50: CPT | Performed by: STUDENT IN AN ORGANIZED HEALTH CARE EDUCATION/TRAINING PROGRAM

## 2021-04-06 PROCEDURE — 86140 C-REACTIVE PROTEIN: CPT | Performed by: INTERNAL MEDICINE

## 2021-04-06 PROCEDURE — 85007 BL SMEAR W/DIFF WBC COUNT: CPT | Performed by: INTERNAL MEDICINE

## 2021-04-06 PROCEDURE — 83880 ASSAY OF NATRIURETIC PEPTIDE: CPT | Performed by: PHYSICIAN ASSISTANT

## 2021-04-06 PROCEDURE — 80053 COMPREHEN METABOLIC PANEL: CPT | Performed by: INTERNAL MEDICINE

## 2021-04-06 PROCEDURE — 85027 COMPLETE CBC AUTOMATED: CPT | Performed by: INTERNAL MEDICINE

## 2021-04-06 RX ADMIN — GUAIFENESIN 600 MG: 600 TABLET ORAL at 09:24

## 2021-04-06 RX ADMIN — REMDESIVIR 100 MG: 100 INJECTION, POWDER, LYOPHILIZED, FOR SOLUTION INTRAVENOUS at 05:39

## 2021-04-06 RX ADMIN — Medication 125 MG: at 00:26

## 2021-04-06 RX ADMIN — Medication 125 MG: at 05:39

## 2021-04-06 RX ADMIN — Medication 125 MG: at 17:28

## 2021-04-06 RX ADMIN — DOXYCYCLINE 100 MG: 100 CAPSULE ORAL at 05:39

## 2021-04-06 RX ADMIN — ENOXAPARIN SODIUM 30 MG: 30 INJECTION SUBCUTANEOUS at 09:28

## 2021-04-06 RX ADMIN — ZINC SULFATE 220 MG (50 MG) CAPSULE 220 MG: CAPSULE at 09:24

## 2021-04-06 RX ADMIN — CEFTRIAXONE SODIUM 1000 MG: 10 INJECTION, POWDER, FOR SOLUTION INTRAVENOUS at 23:03

## 2021-04-06 RX ADMIN — INSULIN LISPRO 8 UNITS: 100 INJECTION, SOLUTION INTRAVENOUS; SUBCUTANEOUS at 21:57

## 2021-04-06 RX ADMIN — INSULIN LISPRO 10 UNITS: 100 INJECTION, SOLUTION INTRAVENOUS; SUBCUTANEOUS at 17:30

## 2021-04-06 RX ADMIN — Medication 2000 UNITS: at 09:24

## 2021-04-06 RX ADMIN — OXYCODONE HYDROCHLORIDE AND ACETAMINOPHEN 1000 MG: 500 TABLET ORAL at 09:24

## 2021-04-06 RX ADMIN — OXYCODONE HYDROCHLORIDE AND ACETAMINOPHEN 1000 MG: 500 TABLET ORAL at 21:57

## 2021-04-06 RX ADMIN — ENOXAPARIN SODIUM 30 MG: 30 INJECTION SUBCUTANEOUS at 21:57

## 2021-04-06 RX ADMIN — Medication 1 PACKET: at 09:26

## 2021-04-06 RX ADMIN — DOXYCYCLINE 100 MG: 100 CAPSULE ORAL at 17:28

## 2021-04-06 RX ADMIN — Medication 125 MG: at 12:22

## 2021-04-06 RX ADMIN — DEXAMETHASONE SODIUM PHOSPHATE 6 MG: 4 INJECTION, SOLUTION INTRA-ARTICULAR; INTRALESIONAL; INTRAMUSCULAR; INTRAVENOUS; SOFT TISSUE at 05:39

## 2021-04-06 RX ADMIN — Medication 125 MG: at 23:03

## 2021-04-06 RX ADMIN — Medication 1 PACKET: at 17:29

## 2021-04-06 RX ADMIN — INSULIN LISPRO 8 UNITS: 100 INJECTION, SOLUTION INTRAVENOUS; SUBCUTANEOUS at 12:25

## 2021-04-06 RX ADMIN — GUAIFENESIN 600 MG: 600 TABLET ORAL at 21:57

## 2021-04-06 RX ADMIN — Medication 1 PACKET: at 12:23

## 2021-04-06 NOTE — PROGRESS NOTES
Progress Note - Pulmonary   Eve Langley 63 y o  female MRN: [id number]  Unit/Bed#: -01 Encounter: [id number]    Assessment:  Acute hypoxemic respiratory failure  COVID-19 pneumonia  C diff colitis    Plan:  Oxygen requirements have improved from 6 L yesterday currently saturating well on 3 L by nasal cannula  Continue to monitor SpO2 and titrate to maintain saturations greater than 89%  Continue treatment per moderate pathway  Vitamin-C, D, zinc, multivitamin  Atorvastatin, famotidine  Dexamethasone 6 mg IV  Remdesivir x5 days  Convalescent plasma: out of window  Anticoagulation: per protocol  Antibiotics: ceftriaxone and doxycycline  Encourage self-proning, activity as tolerated, incentive spirometry  Ongoing monitoring of inflammatory markers and D-dimer  BNP minimally elevated  Patient does not appear volume overloaded      Discussed with RN    Pulmonary will follow peripherally/as needed  Please call with any questions  Subjective:   Patient states her breathing is getting better  Bowel movements are also becoming more normal   She is still fairly fatigued and weak  Appetite is poor  12 point review of systems otherwise negative  Objective:     Vitals: Blood pressure 117/60, pulse 60, temperature 97 9 °F (36 6 °C), temperature source Oral, resp  rate 19, height 5' 5" (1 651 m), weight 81 2 kg (179 lb), SpO2 94 %  ,Body mass index is 29 79 kg/m²  Intake/Output Summary (Last 24 hours) at 4/6/2021 1202  Last data filed at 4/5/2021 1830  Gross per 24 hour   Intake 150 ml   Output --   Net 150 ml       Invasive Devices     Peripheral Intravenous Line            Peripheral IV 04/03/21 Right Hand 3 days                Physical Exam:   General appearance: alert and oriented, in no acute distress  Head: Normocephalic, without obvious abnormality, atraumatic  Eyes: EOMI  No discharge bilaterally  No scleral icterus     Neck: supple, symmetrical, trachea midline  Lungs:  Bibasilar crackles noted  Heart: regular rate and rhythm, S1, S2 normal, no murmur, click, rub or gallop  Abdomen:  No appreciable distension or tenderness  Extremities: No edema or tenderness  Skin: No lesions or pallor noted  No rash  Neurologic: Grossly normal     Labs: I have personally reviewed pertinent lab results  Imaging and other studies: I have personally reviewed pertinent reports

## 2021-04-06 NOTE — ASSESSMENT & PLAN NOTE
Patient presents with generalized abdominal pain, nausea, vomiting, and diarrhea  · In the setting of COVID-19  · LFTs within normal limits  · Her C diff also positive    · Abdominal pain and diarrhea improved now  · Continue PO vanomycin with plan for 10 day course

## 2021-04-06 NOTE — CASE MANAGEMENT
Pt is a tentative d/c in 48 hrs per [facility] (Dr Kya Zuniga)  Pt remains on O2-5 lpm   Will need Home O2 Eval prior to d/c

## 2021-04-06 NOTE — PLAN OF CARE
Problem: Prexisting or High Potential for Compromised Skin Integrity  Goal: Skin integrity is maintained or improved  Description: INTERVENTIONS:  - Identify patients at risk for skin breakdown  - Assess and monitor skin integrity  - Assess and monitor nutrition and hydration status  - Monitor labs   - Assess for incontinence   - Turn and reposition patient  - Assist with mobility/ambulation  - Relieve pressure over bony prominences  - Avoid friction and shearing  - Provide appropriate hygiene as needed including keeping skin clean and dry  - Evaluate need for skin moisturizer/barrier cream  - Collaborate with interdisciplinary team   - Patient/family teaching  - Consider wound care consult   Outcome: Progressing     Problem: Nutrition/Hydration-ADULT  Goal: Nutrient/Hydration intake appropriate for improving, restoring or maintaining nutritional needs  Description: Monitor and assess patient's nutrition/hydration status for malnutrition  Collaborate with interdisciplinary team and initiate plan and interventions as ordered  Monitor patient's weight and dietary intake as ordered or per policy  Utilize nutrition screening tool and intervene as necessary  Determine patient's food preferences and provide high-protein, high-caloric foods as appropriate       INTERVENTIONS:  - Monitor oral intake, urinary output, labs, and treatment plans  - Assess nutrition and hydration status and recommend course of action  - Evaluate amount of meals eaten  - Assist patient with eating if necessary   - Allow adequate time for meals  - Recommend/ encourage appropriate diets, oral nutritional supplements, and vitamin/mineral supplements  - Order, calculate, and assess calorie counts as needed  - Recommend, monitor, and adjust tube feedings based on assessed needs  - Assess need for intravenous fluids  - Provide nutrition/hydration education as appropriate  - Include patient/family/caregiver in decisions related to nutrition  Outcome: Progressing     Problem: Potential for Falls  Goal: Patient will remain free of falls  Description: INTERVENTIONS:  - Assess patient frequently for physical needs  -  Identify cognitive and physical deficits and behaviors that affect risk of falls  -  [place] fall precautions as indicated by assessment   - Educate patient/family on patient safety including physical limitations  - Instruct patient to call for assistance with activity based on assessment  - Modify environment to reduce risk of injury  - Consider OT/PT consult to assist with strengthening/mobility  Outcome: Progressing     Problem: DISCHARGE PLANNING  Goal: Discharge to home or other facility with appropriate resources  Description: INTERVENTIONS:  - Identify barriers to discharge w/patient and caregiver  - Arrange for needed discharge resources and transportation as appropriate  - Identify discharge learning needs (meds, wound care, etc )  - Arrange for interpretive services to assist at discharge as needed  - Refer to Case Management Department for coordinating discharge planning if the patient needs post-hospital services based on physician/advanced practitioner order or complex needs related to functional status, cognitive ability, or social support system  Outcome: Progressing     Problem: Knowledge Deficit  Goal: Patient/family/caregiver demonstrates understanding of disease process, treatment plan, medications, and discharge instructions  Description: Complete learning assessment and assess knowledge base    Interventions:  - Provide teaching at level of understanding  - Provide teaching via preferred learning methods  Outcome: Progressing     Problem: RESPIRATORY - ADULT  Goal: Achieves optimal ventilation and oxygenation  Description: INTERVENTIONS:  - Assess for changes in respiratory status  - Assess for changes in mentation and behavior  - Position to facilitate oxygenation and minimize respiratory effort  - Oxygen administered by appropriate delivery if ordered  - Initiate smoking cessation education as indicated  - Encourage broncho-pulmonary hygiene including cough, deep breathe, Incentive Spirometry  - Assess the need for suctioning and aspirate as needed  - Assess and instruct to report SOB or any respiratory difficulty  - Respiratory Therapy support as indicated  Outcome: Progressing     Problem: GASTROINTESTINAL - ADULT  Goal: Minimal or absence of nausea and/or vomiting  Description: INTERVENTIONS:  - Administer IV fluids if ordered to ensure adequate hydration  - Maintain NPO status until nausea and vomiting are resolved  - Nasogastric tube if ordered  - Administer ordered antiemetic medications as needed  - Provide nonpharmacologic comfort measures as appropriate  - Advance diet as tolerated, if ordered  - Consider nutrition services referral to assist patient with adequate nutrition and appropriate food choices  Outcome: Progressing  Goal: Maintains or returns to baseline bowel function  Description: INTERVENTIONS:  - Assess bowel function  - Encourage oral fluids to ensure adequate hydration  - Administer IV fluids if ordered to ensure adequate hydration  - Administer ordered medications as needed  - Encourage mobilization and activity  - Consider nutritional services referral to assist patient with adequate nutrition and appropriate food choices  Outcome: Progressing

## 2021-04-07 LAB
ALBUMIN SERPL BCP-MCNC: 2.3 G/DL (ref 3.5–5)
ALP SERPL-CCNC: 57 U/L (ref 46–116)
ALT SERPL W P-5'-P-CCNC: 26 U/L (ref 12–78)
ANION GAP SERPL CALCULATED.3IONS-SCNC: 10 MMOL/L (ref 4–13)
AST SERPL W P-5'-P-CCNC: 17 U/L (ref 5–45)
BILIRUB SERPL-MCNC: 0.29 MG/DL (ref 0.2–1)
BUN SERPL-MCNC: 24 MG/DL (ref 5–25)
CALCIUM ALBUM COR SERPL-MCNC: 10.3 MG/DL (ref 8.3–10.1)
CALCIUM SERPL-MCNC: 8.9 MG/DL (ref 8.3–10.1)
CHLORIDE SERPL-SCNC: 107 MMOL/L (ref 100–108)
CO2 SERPL-SCNC: 26 MMOL/L (ref 21–32)
CREAT SERPL-MCNC: 0.75 MG/DL (ref 0.6–1.3)
CRP SERPL QL: 21.6 MG/L
D DIMER PPP FEU-MCNC: 0.49 UG/ML FEU
ERYTHROCYTE [DISTWIDTH] IN BLOOD BY AUTOMATED COUNT: 12.5 % (ref 11.6–15.1)
FERRITIN SERPL-MCNC: 1141 NG/ML (ref 8–388)
GFR SERPL CREATININE-BSD FRML MDRD: 86 ML/MIN/1.73SQ M
GLUCOSE SERPL-MCNC: 124 MG/DL (ref 65–140)
GLUCOSE SERPL-MCNC: 206 MG/DL (ref 65–140)
GLUCOSE SERPL-MCNC: 310 MG/DL (ref 65–140)
GLUCOSE SERPL-MCNC: 372 MG/DL (ref 65–140)
GLUCOSE SERPL-MCNC: 458 MG/DL (ref 65–140)
HCT VFR BLD AUTO: 44.3 % (ref 34.8–46.1)
HGB BLD-MCNC: 14.3 G/DL (ref 11.5–15.4)
MAGNESIUM SERPL-MCNC: 1.9 MG/DL (ref 1.6–2.6)
MCH RBC QN AUTO: 29.8 PG (ref 26.8–34.3)
MCHC RBC AUTO-ENTMCNC: 32.3 G/DL (ref 31.4–37.4)
MCV RBC AUTO: 92 FL (ref 82–98)
NRBC BLD AUTO-RTO: 0 /100 WBCS
PLATELET # BLD AUTO: 360 THOUSANDS/UL (ref 149–390)
PMV BLD AUTO: 10.2 FL (ref 8.9–12.7)
POTASSIUM SERPL-SCNC: 3.6 MMOL/L (ref 3.5–5.3)
PROT SERPL-MCNC: 6.1 G/DL (ref 6.4–8.2)
RBC # BLD AUTO: 4.8 MILLION/UL (ref 3.81–5.12)
SODIUM SERPL-SCNC: 143 MMOL/L (ref 136–145)
WBC # BLD AUTO: 11.38 THOUSAND/UL (ref 4.31–10.16)

## 2021-04-07 PROCEDURE — 83735 ASSAY OF MAGNESIUM: CPT | Performed by: STUDENT IN AN ORGANIZED HEALTH CARE EDUCATION/TRAINING PROGRAM

## 2021-04-07 PROCEDURE — 82728 ASSAY OF FERRITIN: CPT | Performed by: STUDENT IN AN ORGANIZED HEALTH CARE EDUCATION/TRAINING PROGRAM

## 2021-04-07 PROCEDURE — 80053 COMPREHEN METABOLIC PANEL: CPT | Performed by: STUDENT IN AN ORGANIZED HEALTH CARE EDUCATION/TRAINING PROGRAM

## 2021-04-07 PROCEDURE — 85027 COMPLETE CBC AUTOMATED: CPT | Performed by: STUDENT IN AN ORGANIZED HEALTH CARE EDUCATION/TRAINING PROGRAM

## 2021-04-07 PROCEDURE — 99233 SBSQ HOSP IP/OBS HIGH 50: CPT | Performed by: STUDENT IN AN ORGANIZED HEALTH CARE EDUCATION/TRAINING PROGRAM

## 2021-04-07 PROCEDURE — 86140 C-REACTIVE PROTEIN: CPT | Performed by: STUDENT IN AN ORGANIZED HEALTH CARE EDUCATION/TRAINING PROGRAM

## 2021-04-07 PROCEDURE — 82948 REAGENT STRIP/BLOOD GLUCOSE: CPT

## 2021-04-07 PROCEDURE — 85379 FIBRIN DEGRADATION QUANT: CPT | Performed by: STUDENT IN AN ORGANIZED HEALTH CARE EDUCATION/TRAINING PROGRAM

## 2021-04-07 RX ADMIN — OXYCODONE HYDROCHLORIDE AND ACETAMINOPHEN 1000 MG: 500 TABLET ORAL at 09:22

## 2021-04-07 RX ADMIN — INSULIN LISPRO 10 UNITS: 100 INJECTION, SOLUTION INTRAVENOUS; SUBCUTANEOUS at 17:15

## 2021-04-07 RX ADMIN — CEFTRIAXONE SODIUM 1000 MG: 10 INJECTION, POWDER, FOR SOLUTION INTRAVENOUS at 23:27

## 2021-04-07 RX ADMIN — Medication 125 MG: at 11:49

## 2021-04-07 RX ADMIN — Medication 1 PACKET: at 17:13

## 2021-04-07 RX ADMIN — Medication 1 PACKET: at 11:49

## 2021-04-07 RX ADMIN — ZINC SULFATE 220 MG (50 MG) CAPSULE 220 MG: CAPSULE at 09:22

## 2021-04-07 RX ADMIN — DOXYCYCLINE 100 MG: 100 CAPSULE ORAL at 17:12

## 2021-04-07 RX ADMIN — ENOXAPARIN SODIUM 30 MG: 30 INJECTION SUBCUTANEOUS at 09:26

## 2021-04-07 RX ADMIN — INSULIN LISPRO 4 UNITS: 100 INJECTION, SOLUTION INTRAVENOUS; SUBCUTANEOUS at 09:27

## 2021-04-07 RX ADMIN — Medication 125 MG: at 05:03

## 2021-04-07 RX ADMIN — ENOXAPARIN SODIUM 30 MG: 30 INJECTION SUBCUTANEOUS at 21:20

## 2021-04-07 RX ADMIN — GUAIFENESIN 600 MG: 600 TABLET ORAL at 21:20

## 2021-04-07 RX ADMIN — REMDESIVIR 100 MG: 100 INJECTION, POWDER, LYOPHILIZED, FOR SOLUTION INTRAVENOUS at 10:46

## 2021-04-07 RX ADMIN — INSULIN LISPRO 12 UNITS: 100 INJECTION, SOLUTION INTRAVENOUS; SUBCUTANEOUS at 11:50

## 2021-04-07 RX ADMIN — GUAIFENESIN 600 MG: 600 TABLET ORAL at 09:22

## 2021-04-07 RX ADMIN — Medication 2000 UNITS: at 09:22

## 2021-04-07 RX ADMIN — DOXYCYCLINE 100 MG: 100 CAPSULE ORAL at 05:03

## 2021-04-07 RX ADMIN — Medication 125 MG: at 23:26

## 2021-04-07 RX ADMIN — DEXAMETHASONE SODIUM PHOSPHATE 6 MG: 4 INJECTION, SOLUTION INTRA-ARTICULAR; INTRALESIONAL; INTRAMUSCULAR; INTRAVENOUS; SOFT TISSUE at 05:04

## 2021-04-07 RX ADMIN — Medication 125 MG: at 17:13

## 2021-04-07 RX ADMIN — OXYCODONE HYDROCHLORIDE AND ACETAMINOPHEN 1000 MG: 500 TABLET ORAL at 21:20

## 2021-04-07 RX ADMIN — Medication 1 PACKET: at 09:22

## 2021-04-07 RX ADMIN — INSULIN LISPRO 8 UNITS: 100 INJECTION, SOLUTION INTRAVENOUS; SUBCUTANEOUS at 21:21

## 2021-04-07 NOTE — PROGRESS NOTES
[address] [facility]  Progress Note Kenedy Dural 1960, 63 y o  female MRN: [id number]  Unit/Bed#: MS [facility] Encounter: [id number]  Primary Care Provider: No primary care provider on file  Date and time admitted to hospital: 4/3/2021  2:12 AM    Elevated blood sugar  Assessment & Plan  · Likely from steroid  A1c 11 6%  · Insulin sliding scale    Results from last 7 days   Lab Units 04/07/21  0520 04/06/21  0601 04/05/21  1752 04/05/21  0552 04/04/21  0509 04/03/21  0218   GLUCOSE RANDOM mg/dL 124 83 508* 370* 348* 274*         C  difficile colitis  Assessment & Plan  · Started on oral vancomycin  · No previous history  Will continue for 10 days total    Abdominal pain  Assessment & Plan  Patient presents with generalized abdominal pain, nausea, vomiting, and diarrhea  · In the setting of COVID-19  · LFTs within normal limits  · Her C diff also positive  · Abdominal pain and diarrhea improved now  · Continue PO vanomycin with plan for 10 day course    Sepsis [facility])  Assessment & Plan  Patient meeting sepsis criteria with tachypnea, tachycardia  In the setting of COVID-19  · Moderate COVID-19 pathway  · Procalcitonin elevated  Continue IV antibiotics also  · Blood culture no growth in 72+ hours    Acute respiratory failure (HCC)  Assessment & Plan  · In the setting of COVID-19  · Currently saturating at 94% on 5 L via nasal cannula, wean as tolerated  · Respiratory protocol  · Pulmonary evaluation appreciated    * Pneumonia due to COVID-19 virus  Assessment & Plan  Patient tested positive on 03/29, has had worsening shortness of breath, fevers, cough   Moderate COVID pathway as below    Supplemental O2 to keep O2 sat more than 90%  Currently on 5 L   CXR worsening bilateral infiltrate   Started on IV dexamethasone and IV remdesivir  Continue   Procalcitonin elevated   Continue IV antibiotics    Ceftriaxone and doxycycline   AC with Lovenox   OOB TID, ambulation and mobilization encouraged   Self proning as tolerated   Monitor CBC, CMP, inflammatory markers        VTE Pharmacologic Prophylaxis:   Pharmacologic: Enoxaparin (Lovenox)  Mechanical VTE Prophylaxis in Place: Yes    Patient Centered Rounds: I have performed bedside rounds with nursing staff today  Time Spent for Care: 30 minutes  More than 50% of total time spent on counseling and coordination of care as described above  Current Length of Stay: 4 day(s)    Current Patient Status: Inpatient   Certification Statement: The patient will continue to require additional inpatient hospital stay due to covid 19, hypoxic respiratory failure  IV antibiotics    Discharge Plan: to be determined     Code Status: Level 1 - Full Code      Subjective:   Patient feels better, weakness improved  Shortness of breath better  Appetite is good  No chest pain and palpitations  Objective:     Vitals:   Temp (24hrs), Av 2 °F (36 8 °C), Min:98 1 °F (36 7 °C), Max:98 3 °F (36 8 °C)    Temp:  [98 1 °F (36 7 °C)-98 3 °F (36 8 °C)] 98 1 °F (36 7 °C)  HR:  [59-60] 60  Resp:  [18-19] 19  BP: ()/(57-74) 130/69  SpO2:  [92 %-95 %] 94 %  Body mass index is 29 79 kg/m²  Input and Output Summary (last 24 hours): Intake/Output Summary (Last 24 hours) at 2021 1645  Last data filed at 2021 0300  Gross per 24 hour   Intake --   Output 0 ml   Net 0 ml       Physical Exam:     Physical Exam  Vitals signs and nursing note reviewed  Constitutional:       Appearance: Normal appearance  HENT:      Head: Normocephalic and atraumatic  Nose: Nose normal  No congestion  Mouth/Throat:      Mouth: Mucous membranes are dry  Pharynx: Oropharynx is clear  Eyes:      General:         Right eye: No discharge  Left eye: No discharge  Neck:      Musculoskeletal: Normal range of motion and neck supple  Cardiovascular:      Rate and Rhythm: Normal rate and regular rhythm     Pulmonary:      Effort: Pulmonary effort is normal  No respiratory distress  Comments: 5 L nasal canula   Abdominal:      General: Abdomen is flat  Palpations: Abdomen is soft  Musculoskeletal:      Right lower leg: No edema  Left lower leg: No edema  Skin:     General: Skin is warm and dry  Neurological:      General: No focal deficit present  Mental Status: She is alert  Mental status is at baseline  Psychiatric:         Mood and Affect: Mood normal          Behavior: Behavior normal            Additional Data:     Labs:    Results from last 7 days   Lab Units 04/07/21  0520 04/06/21  0601   WBC Thousand/uL 11 38* 13 34*   HEMOGLOBIN g/dL 14 3 14 9   HEMATOCRIT % 44 3 46 0   PLATELETS Thousands/uL 360 434*   BANDS PCT %  --  1   LYMPHO PCT %  --  31   MONO PCT %  --  4   EOS PCT %  --  0     Results from last 7 days   Lab Units 04/07/21  0520   SODIUM mmol/L 143   POTASSIUM mmol/L 3 6   CHLORIDE mmol/L 107   CO2 mmol/L 26   BUN mg/dL 24   CREATININE mg/dL 0 75   ANION GAP mmol/L 10   CALCIUM mg/dL 8 9   ALBUMIN g/dL 2 3*   TOTAL BILIRUBIN mg/dL 0 29   ALK PHOS U/L 57   ALT U/L 26   AST U/L 17   GLUCOSE RANDOM mg/dL 124         Results from last 7 days   Lab Units 04/07/21  1539 04/07/21  1124 04/07/21  0729 04/06/21  2047 04/06/21  1536 04/06/21  1110 04/06/21  0755 04/05/21  2037 04/05/21  1854 04/05/21  1745 04/05/21  1552 04/03/21  0233   POC GLUCOSE mg/dl 372* 458* 206* 335* 362* 331* 147* 374* 448* 465* >500* 247*     Results from last 7 days   Lab Units 04/05/21  1752   HEMOGLOBIN A1C % 11 6*     Results from last 7 days   Lab Units 04/04/21  0509 04/03/21  0218   LACTIC ACID mmol/L  --  1 8   PROCALCITONIN ng/ml 1 11* 2 56*           * I Have Reviewed All Lab Data Listed Above  * Additional Pertinent Lab Tests Reviewed:  All Labs For Current Hospital Admission Reviewed    Imaging:    Imaging Reports Reviewed Today Include: na  Imaging Personally Reviewed by Myself Includes:  na    Recent Cultures (last 7 days):     Results from last 7 days   Lab Units 04/03/21  1228 04/03/21  0227 04/03/21  0218   BLOOD CULTURE   --  No Growth After 4 Days  No Growth After 4 Days  C DIFF TOXIN B BY PCR  Positive*  Positive*  --   --        Last 24 Hours Medication List:   Current Facility-Administered Medications   Medication Dose Route Frequency Provider Last Rate    acetaminophen  650 mg Oral Q6H PRN Sheila Chris PA-C      ascorbic acid  1,000 mg Oral Q12H [facility] Sheila Chris PA-C      cefTRIAXone  1,000 mg Intravenous Q24H Sheila Chris PA-C 1,000 mg (04/06/21 2303)    cholecalciferol  2,000 Units Oral Daily Sheila Chris PA-C      dexamethasone  6 mg Intravenous Q24H Sheila Chris PA-C      dicyclomine  10 mg Oral 4x Daily PRN Sheila Chris PA-C      docusate sodium  100 mg Oral BID Sheila Chris PA-C      doxycycline hyclate  100 mg Oral Q12H Sheila Chris PA-C      enoxaparin  30 mg Subcutaneous Q12H [facility] Noe Shabazz      guaiFENesin  600 mg Oral Q12H [facility] Sheila Chris PA-C      insulin lispro  2-12 Units Subcutaneous TID AC Enrique Cobian MD      insulin lispro  2-12 Units Subcutaneous HS Enrique Cobian MD      lactobacillus acidophilus-bulgaricus  1 packet Oral TID With Meals Enrique Cobian MD      metoclopramide  10 mg Intravenous Q6H PRN Sheila Chris PA-C      zinc sulfate  220 mg Oral Daily Sheila Chris PA-C      Followed by   Kelly Cummings ON 4/10/2021] multivitamin-minerals  1 tablet Oral Daily Sheila Chris PA-C      ondansetron  4 mg Intravenous Q6H PRN Sheila Chris PA-C      vancomycin  125 mg Oral Q6H Renard Soriano MD          Today, Patient Was Seen By: LISA Street      ** Please Note: Dictation voice to text software may have been used in the creation of this document   **

## 2021-04-07 NOTE — PROGRESS NOTES
[address] [facility]  Progress Note Mesfin Morgan 1960, 63 y o  female MRN: [id number]  Unit/Bed#: -01 Encounter: [id number]  Primary Care Provider: No primary care provider on file  Date and time admitted to hospital: 4/3/2021  2:12 AM    Elevated blood sugar  Assessment & Plan  · Likely from steroid  A1c 11 6%  · Insulin sliding scale    C  difficile colitis  Assessment & Plan  · Started on oral vancomycin  · No previous history  Will continue for 10 days total    Abdominal pain  Assessment & Plan  Patient presents with generalized abdominal pain, nausea, vomiting, and diarrhea  · In the setting of COVID-19  · LFTs within normal limits  · Her C diff also positive  · Abdominal pain and diarrhea improved now  · Continue PO vanomycin with plan for 10 day course    Sepsis [facility])  Assessment & Plan  Patient meeting sepsis criteria with tachypnea, tachycardia  In the setting of COVID-19  · Moderate COVID-19 pathway  · Procalcitonin elevated  Continue IV antibiotics also  · Blood culture no growth in 72+ hours    Acute respiratory failure (HCC)  Assessment & Plan  · In the setting of COVID-19  · Currently saturating at 94% on 5 L via nasal cannula, wean as tolerated  · Respiratory protocol  · Pulmonary evaluation appreciated    * Pneumonia due to COVID-19 virus  Assessment & Plan  Patient tested positive on 03/29, has had worsening shortness of breath, fevers, cough   Moderate COVID pathway as below    Supplemental O2 to keep O2 sat more than 90%  Currently on 5 L   CXR worsening bilateral infiltrate   Started on IV dexamethasone and IV remdesivir  Continue   Procalcitonin elevated   Continue IV antibiotics    Ceftriaxone and doxycycline   AC with Lovenox   OOB TID, ambulation and mobilization encouraged   Self proning as tolerated   Monitor CBC, CMP, inflammatory markers        VTE Pharmacologic Prophylaxis:   Pharmacologic: Enoxaparin (Lovenox)  Mechanical VTE Prophylaxis in Place: Yes    Patient Centered Rounds: I have performed bedside rounds with nursing staff today  Discussions with Specialists or Other Care Team Provider: pulmonology       Time Spent for Care: 30 minutes  More than 50% of total time spent on counseling and coordination of care as described above  Current Length of Stay: 3 day(s)    Current Patient Status: Inpatient   Certification Statement: The patient will continue to require additional inpatient hospital stay due to hypoxic respiratory failure    Discharge Plan: 24-48 hours     Code Status: Level 1 - Full Code      Subjective:   Patient feels better today  Having bowel movements  Still weak  Appetite is poor  Breathing has improved  Objective:     Vitals:   Temp (24hrs), Av 1 °F (36 7 °C), Min:97 9 °F (36 6 °C), Max:98 2 °F (36 8 °C)    Temp:  [97 9 °F (36 6 °C)-98 2 °F (36 8 °C)] 98 2 °F (36 8 °C)  HR:  [50-64] 64  Resp:  [19-20] 20  BP: (117-122)/(60-66) 122/66  SpO2:  [89 %-96 %] 89 %  Body mass index is 29 79 kg/m²  Input and Output Summary (last 24 hours):     No intake or output data in the 24 hours ending 21    Physical Exam:     Physical Exam  Vitals signs and nursing note reviewed  Constitutional:       Appearance: Normal appearance  HENT:      Head: Normocephalic and atraumatic  Nose: Nose normal  No congestion  Mouth/Throat:      Mouth: Mucous membranes are dry  Pharynx: Oropharynx is clear  Eyes:      General:         Right eye: No discharge  Left eye: No discharge  Neck:      Musculoskeletal: Normal range of motion and neck supple  Cardiovascular:      Rate and Rhythm: Normal rate and regular rhythm  Pulmonary:      Effort: Pulmonary effort is normal  No respiratory distress  Breath sounds: Rales present  Abdominal:      General: Abdomen is flat  Palpations: Abdomen is soft  Musculoskeletal:      Right lower leg: No edema  Left lower leg: No edema  Skin:     General: Skin is warm and dry  Neurological:      General: No focal deficit present  Mental Status: She is alert  Mental status is at baseline  Psychiatric:         Mood and Affect: Mood normal          Behavior: Behavior normal            Additional Data:     Labs:    Results from last 7 days   Lab Units 04/06/21  0601   WBC Thousand/uL 13 34*   HEMOGLOBIN g/dL 14 9   HEMATOCRIT % 46 0   PLATELETS Thousands/uL 434*   BANDS PCT % 1   LYMPHO PCT % 31   MONO PCT % 4   EOS PCT % 0     Results from last 7 days   Lab Units 04/06/21  0601   SODIUM mmol/L 146*   POTASSIUM mmol/L 3 4*   CHLORIDE mmol/L 106   CO2 mmol/L 29   BUN mg/dL 24   CREATININE mg/dL 0 75   ANION GAP mmol/L 11   CALCIUM mg/dL 9 2   ALBUMIN g/dL 2 5*   TOTAL BILIRUBIN mg/dL 0 33   ALK PHOS U/L 60   ALT U/L 24   AST U/L 20   GLUCOSE RANDOM mg/dL 83         Results from last 7 days   Lab Units 04/06/21  1536 04/06/21  1110 04/06/21  0755 04/05/21  2037 04/05/21  1854 04/05/21  1745 04/05/21  1552 04/03/21  0233   POC GLUCOSE mg/dl 362* 331* 147* 374* 448* 465* >500* 247*     Results from last 7 days   Lab Units 04/05/21  1752   HEMOGLOBIN A1C % 11 6*     Results from last 7 days   Lab Units 04/04/21  0509 04/03/21  0218   LACTIC ACID mmol/L  --  1 8   PROCALCITONIN ng/ml 1 11* 2 56*           * I Have Reviewed All Lab Data Listed Above  * Additional Pertinent Lab Tests Reviewed: Shi 66 Admission Reviewed    Imaging:    Imaging Reports Reviewed Today Include: na  Imaging Personally Reviewed by Myself Includes:  na    Recent Cultures (last 7 days):     Results from last 7 days   Lab Units 04/03/21  1228 04/03/21  0227 04/03/21  0218   BLOOD CULTURE   --  No Growth at 72 hrs  No Growth at 72 hrs     C DIFF TOXIN B BY PCR  Positive*  Positive*  --   --        Last 24 Hours Medication List:   Current Facility-Administered Medications   Medication Dose Route Frequency Provider Last Rate    acetaminophen  650 mg Oral Q6H PRN Kirit Cid PA-C      ascorbic acid  1,000 mg Oral Q12H [address] [place], [place]      cefTRIAXone  1,000 mg Intravenous Q24H BETSY YiC 1,000 mg (04/05/21 2220)    cholecalciferol  2,000 Units Oral Daily Kirit BRITTANEY Cid      dexamethasone  6 mg Intravenous Q24H Kirit Cid PA-C      dicyclomine  10 mg Oral 4x Daily PRN Kirit Cid PA-C      docusate sodium  100 mg Oral BID Kirit Cid PA-C      doxycycline hyclate  100 mg Oral Q12H Kirit Cid PA-C      enoxaparin  30 mg Subcutaneous Q12H [address] [facility] [place], [place]      guaiFENesin  600 mg Oral Q12H [address] Kirit Cid PA-C      insulin lispro  2-12 Units Subcutaneous TID AC Sorin Eller MD      insulin lispro  2-12 Units Subcutaneous HS Sorin Eller MD      lactobacillus acidophilus-bulgaricus  1 packet Oral TID With Meals Sorin Eller MD      metoclopramide  10 mg Intravenous Q6H PRN Kirit Cid PA-C      zinc sulfate  220 mg Oral Daily Kirit Cid PA-C      Followed by   Cookie José ON 4/10/2021] multivitamin-minerals  1 tablet Oral Daily Kirit Cid PA-C      ondansetron  4 mg Intravenous Q6H PRN Kirit Cid PA-C      remdesivir  100 mg Intravenous Q24H Kirit Cid PA-C 100 mg (04/06/21 0539)    vancomycin  125 mg Oral Q6H Skylar Al MD          Today, Patient Was Seen By: LISA Irene      ** Please Note: Dictation voice to text software may have been used in the creation of this document   **

## 2021-04-07 NOTE — PLAN OF CARE
Problem: Prexisting or High Potential for Compromised Skin Integrity  Goal: Skin integrity is maintained or improved  Description: INTERVENTIONS:  - Identify patients at risk for skin breakdown  - Assess and monitor skin integrity  - Assess and monitor nutrition and hydration status  - Monitor labs   - Assess for incontinence   - Turn and reposition patient  - Assist with mobility/ambulation  - Relieve pressure over bony prominences  - Avoid friction and shearing  - Provide appropriate hygiene as needed including keeping skin clean and dry  - Evaluate need for skin moisturizer/barrier cream  - Collaborate with interdisciplinary team   - Patient/family teaching  - Consider wound care consult   Outcome: Progressing     Problem: Nutrition/Hydration-ADULT  Goal: Nutrient/Hydration intake appropriate for improving, restoring or maintaining nutritional needs  Description: Monitor and assess patient's nutrition/hydration status for malnutrition  Collaborate with interdisciplinary team and initiate plan and interventions as ordered  Monitor patient's weight and dietary intake as ordered or per policy  Utilize nutrition screening tool and intervene as necessary  Determine patient's food preferences and provide high-protein, high-caloric foods as appropriate       INTERVENTIONS:  - Monitor oral intake, urinary output, labs, and treatment plans  - Assess nutrition and hydration status and recommend course of action  - Evaluate amount of meals eaten  - Assist patient with eating if necessary   - Allow adequate time for meals  - Recommend/ encourage appropriate diets, oral nutritional supplements, and vitamin/mineral supplements  - Order, calculate, and assess calorie counts as needed  - Recommend, monitor, and adjust tube feedings based on assessed needs  - Assess need for intravenous fluids  - Provide nutrition/hydration education as appropriate  - Include patient/family/caregiver in decisions related to nutrition  Outcome: Progressing     Problem: Potential for Falls  Goal: Patient will remain free of falls  Description: INTERVENTIONS:  - Assess patient frequently for physical needs  -  Identify cognitive and physical deficits and behaviors that affect risk of falls  -  [place] fall precautions as indicated by assessment   - Educate patient/family on patient safety including physical limitations  - Instruct patient to call for assistance with activity based on assessment  - Modify environment to reduce risk of injury  - Consider OT/PT consult to assist with strengthening/mobility  Outcome: Progressing     Problem: DISCHARGE PLANNING  Goal: Discharge to home or other facility with appropriate resources  Description: INTERVENTIONS:  - Identify barriers to discharge w/patient and caregiver  - Arrange for needed discharge resources and transportation as appropriate  - Identify discharge learning needs (meds, wound care, etc )  - Arrange for interpretive services to assist at discharge as needed  - Refer to Case Management Department for coordinating discharge planning if the patient needs post-hospital services based on physician/advanced practitioner order or complex needs related to functional status, cognitive ability, or social support system  Outcome: Progressing     Problem: Knowledge Deficit  Goal: Patient/family/caregiver demonstrates understanding of disease process, treatment plan, medications, and discharge instructions  Description: Complete learning assessment and assess knowledge base    Interventions:  - Provide teaching at level of understanding  - Provide teaching via preferred learning methods  Outcome: Progressing     Problem: RESPIRATORY - ADULT  Goal: Achieves optimal ventilation and oxygenation  Description: INTERVENTIONS:  - Assess for changes in respiratory status  - Assess for changes in mentation and behavior  - Position to facilitate oxygenation and minimize respiratory effort  - Oxygen administered by appropriate delivery if ordered  - Initiate smoking cessation education as indicated  - Encourage broncho-pulmonary hygiene including cough, deep breathe, Incentive Spirometry  - Assess the need for suctioning and aspirate as needed  - Assess and instruct to report SOB or any respiratory difficulty  - Respiratory Therapy support as indicated  Outcome: Progressing     Problem: GASTROINTESTINAL - ADULT  Goal: Minimal or absence of nausea and/or vomiting  Description: INTERVENTIONS:  - Administer IV fluids if ordered to ensure adequate hydration  - Maintain NPO status until nausea and vomiting are resolved  - Nasogastric tube if ordered  - Administer ordered antiemetic medications as needed  - Provide nonpharmacologic comfort measures as appropriate  - Advance diet as tolerated, if ordered  - Consider nutrition services referral to assist patient with adequate nutrition and appropriate food choices  Outcome: Progressing  Goal: Maintains or returns to baseline bowel function  Description: INTERVENTIONS:  - Assess bowel function  - Encourage oral fluids to ensure adequate hydration  - Administer IV fluids if ordered to ensure adequate hydration  - Administer ordered medications as needed  - Encourage mobilization and activity  - Consider nutritional services referral to assist patient with adequate nutrition and appropriate food choices  Outcome: Progressing

## 2021-04-07 NOTE — ASSESSMENT & PLAN NOTE
Patient meeting sepsis criteria with tachypnea, tachycardia  In the setting of COVID-19  · Moderate COVID-19 pathway  · Procalcitonin elevated  Continue IV antibiotics also    · Blood culture no growth in 72+ hours

## 2021-04-07 NOTE — CASE MANAGEMENT
Pt is a tentative d/c in 48 hrs per [facility] (Dr LUIS ANGEL SANCHEZ)  Still being treated for Covid and C-diff

## 2021-04-08 PROBLEM — E11.9 TYPE 2 DIABETES MELLITUS (HCC): Status: ACTIVE | Noted: 2021-04-05

## 2021-04-08 LAB
ALBUMIN SERPL BCP-MCNC: 2.1 G/DL (ref 3.5–5)
ALP SERPL-CCNC: 57 U/L (ref 46–116)
ALT SERPL W P-5'-P-CCNC: 25 U/L (ref 12–78)
ANION GAP SERPL CALCULATED.3IONS-SCNC: 8 MMOL/L (ref 4–13)
AST SERPL W P-5'-P-CCNC: 14 U/L (ref 5–45)
BACTERIA BLD CULT: NORMAL
BACTERIA BLD CULT: NORMAL
BILIRUB SERPL-MCNC: 0.31 MG/DL (ref 0.2–1)
BUN SERPL-MCNC: 21 MG/DL (ref 5–25)
CALCIUM ALBUM COR SERPL-MCNC: 9.9 MG/DL (ref 8.3–10.1)
CALCIUM SERPL-MCNC: 8.4 MG/DL (ref 8.3–10.1)
CHLORIDE SERPL-SCNC: 106 MMOL/L (ref 100–108)
CO2 SERPL-SCNC: 27 MMOL/L (ref 21–32)
CREAT SERPL-MCNC: 0.69 MG/DL (ref 0.6–1.3)
CRP SERPL QL: 12.6 MG/L
D DIMER PPP FEU-MCNC: <0.27 UG/ML FEU
ERYTHROCYTE [DISTWIDTH] IN BLOOD BY AUTOMATED COUNT: 12.5 % (ref 11.6–15.1)
FERRITIN SERPL-MCNC: 1098 NG/ML (ref 8–388)
GFR SERPL CREATININE-BSD FRML MDRD: 94 ML/MIN/1.73SQ M
GLUCOSE SERPL-MCNC: 117 MG/DL (ref 65–140)
GLUCOSE SERPL-MCNC: 298 MG/DL (ref 65–140)
GLUCOSE SERPL-MCNC: 351 MG/DL (ref 65–140)
GLUCOSE SERPL-MCNC: 362 MG/DL (ref 65–140)
GLUCOSE SERPL-MCNC: 89 MG/DL (ref 65–140)
HCT VFR BLD AUTO: 42.6 % (ref 34.8–46.1)
HGB BLD-MCNC: 14.2 G/DL (ref 11.5–15.4)
MAGNESIUM SERPL-MCNC: 1.8 MG/DL (ref 1.6–2.6)
MCH RBC QN AUTO: 30.1 PG (ref 26.8–34.3)
MCHC RBC AUTO-ENTMCNC: 33.3 G/DL (ref 31.4–37.4)
MCV RBC AUTO: 90 FL (ref 82–98)
NRBC BLD AUTO-RTO: 0 /100 WBCS
PLATELET # BLD AUTO: 369 THOUSANDS/UL (ref 149–390)
PMV BLD AUTO: 10.3 FL (ref 8.9–12.7)
POTASSIUM SERPL-SCNC: 3.4 MMOL/L (ref 3.5–5.3)
PROT SERPL-MCNC: 5.6 G/DL (ref 6.4–8.2)
RBC # BLD AUTO: 4.71 MILLION/UL (ref 3.81–5.12)
SODIUM SERPL-SCNC: 141 MMOL/L (ref 136–145)
WBC # BLD AUTO: 16.35 THOUSAND/UL (ref 4.31–10.16)

## 2021-04-08 PROCEDURE — 85027 COMPLETE CBC AUTOMATED: CPT | Performed by: STUDENT IN AN ORGANIZED HEALTH CARE EDUCATION/TRAINING PROGRAM

## 2021-04-08 PROCEDURE — 82948 REAGENT STRIP/BLOOD GLUCOSE: CPT

## 2021-04-08 PROCEDURE — 86140 C-REACTIVE PROTEIN: CPT | Performed by: STUDENT IN AN ORGANIZED HEALTH CARE EDUCATION/TRAINING PROGRAM

## 2021-04-08 PROCEDURE — 85379 FIBRIN DEGRADATION QUANT: CPT | Performed by: STUDENT IN AN ORGANIZED HEALTH CARE EDUCATION/TRAINING PROGRAM

## 2021-04-08 PROCEDURE — 99232 SBSQ HOSP IP/OBS MODERATE 35: CPT | Performed by: PHYSICIAN ASSISTANT

## 2021-04-08 PROCEDURE — 83735 ASSAY OF MAGNESIUM: CPT | Performed by: STUDENT IN AN ORGANIZED HEALTH CARE EDUCATION/TRAINING PROGRAM

## 2021-04-08 PROCEDURE — 82728 ASSAY OF FERRITIN: CPT | Performed by: STUDENT IN AN ORGANIZED HEALTH CARE EDUCATION/TRAINING PROGRAM

## 2021-04-08 PROCEDURE — 80053 COMPREHEN METABOLIC PANEL: CPT | Performed by: STUDENT IN AN ORGANIZED HEALTH CARE EDUCATION/TRAINING PROGRAM

## 2021-04-08 PROCEDURE — 99233 SBSQ HOSP IP/OBS HIGH 50: CPT | Performed by: STUDENT IN AN ORGANIZED HEALTH CARE EDUCATION/TRAINING PROGRAM

## 2021-04-08 RX ORDER — POTASSIUM CHLORIDE 20 MEQ/1
40 TABLET, EXTENDED RELEASE ORAL ONCE
Status: COMPLETED | OUTPATIENT
Start: 2021-04-08 | End: 2021-04-08

## 2021-04-08 RX ORDER — LANOLIN ALCOHOL/MO/W.PET/CERES
6 CREAM (GRAM) TOPICAL
Status: DISCONTINUED | OUTPATIENT
Start: 2021-04-08 | End: 2021-04-12 | Stop reason: HOSPADM

## 2021-04-08 RX ADMIN — ENOXAPARIN SODIUM 30 MG: 30 INJECTION SUBCUTANEOUS at 09:37

## 2021-04-08 RX ADMIN — Medication 1 PACKET: at 17:25

## 2021-04-08 RX ADMIN — DOXYCYCLINE 100 MG: 100 CAPSULE ORAL at 05:22

## 2021-04-08 RX ADMIN — INSULIN LISPRO 10 UNITS: 100 INJECTION, SOLUTION INTRAVENOUS; SUBCUTANEOUS at 17:27

## 2021-04-08 RX ADMIN — INSULIN LISPRO 6 UNITS: 100 INJECTION, SOLUTION INTRAVENOUS; SUBCUTANEOUS at 21:54

## 2021-04-08 RX ADMIN — GUAIFENESIN 600 MG: 600 TABLET ORAL at 09:37

## 2021-04-08 RX ADMIN — GUAIFENESIN 600 MG: 600 TABLET ORAL at 21:54

## 2021-04-08 RX ADMIN — Medication 125 MG: at 17:30

## 2021-04-08 RX ADMIN — CEFTRIAXONE SODIUM 1000 MG: 10 INJECTION, POWDER, FOR SOLUTION INTRAVENOUS at 23:08

## 2021-04-08 RX ADMIN — OXYCODONE HYDROCHLORIDE AND ACETAMINOPHEN 1000 MG: 500 TABLET ORAL at 21:54

## 2021-04-08 RX ADMIN — Medication 125 MG: at 05:14

## 2021-04-08 RX ADMIN — OXYCODONE HYDROCHLORIDE AND ACETAMINOPHEN 1000 MG: 500 TABLET ORAL at 09:37

## 2021-04-08 RX ADMIN — Medication 2000 UNITS: at 09:37

## 2021-04-08 RX ADMIN — ENOXAPARIN SODIUM 30 MG: 30 INJECTION SUBCUTANEOUS at 21:54

## 2021-04-08 RX ADMIN — MELATONIN TAB 3 MG 6 MG: 3 TAB at 23:09

## 2021-04-08 RX ADMIN — ZINC SULFATE 220 MG (50 MG) CAPSULE 220 MG: CAPSULE at 09:37

## 2021-04-08 RX ADMIN — DEXAMETHASONE SODIUM PHOSPHATE 6 MG: 4 INJECTION, SOLUTION INTRA-ARTICULAR; INTRALESIONAL; INTRAMUSCULAR; INTRAVENOUS; SOFT TISSUE at 05:15

## 2021-04-08 RX ADMIN — Medication 1 PACKET: at 09:37

## 2021-04-08 RX ADMIN — Medication 125 MG: at 12:29

## 2021-04-08 RX ADMIN — Medication 125 MG: at 23:08

## 2021-04-08 RX ADMIN — INSULIN LISPRO 10 UNITS: 100 INJECTION, SOLUTION INTRAVENOUS; SUBCUTANEOUS at 12:30

## 2021-04-08 RX ADMIN — Medication 1 PACKET: at 12:29

## 2021-04-08 RX ADMIN — POTASSIUM CHLORIDE 40 MEQ: 1500 TABLET, EXTENDED RELEASE ORAL at 09:36

## 2021-04-08 RX ADMIN — DOXYCYCLINE 100 MG: 100 CAPSULE ORAL at 17:25

## 2021-04-08 NOTE — ASSESSMENT & PLAN NOTE
 Moderate covid 19 infection   Continue with IV antibiotics x 7 days due to elevated procalcitonin   Continue IV decadron   Completed course of remdesivir    Continue lovenox D dimer max <1   Monitor inflammatory markers and oxygen status   Improving

## 2021-04-08 NOTE — PLAN OF CARE
Problem: Prexisting or High Potential for Compromised Skin Integrity  Goal: Skin integrity is maintained or improved  Description: INTERVENTIONS:  - Identify patients at risk for skin breakdown  - Assess and monitor skin integrity  - Assess and monitor nutrition and hydration status  - Monitor labs   - Assess for incontinence   - Turn and reposition patient  - Assist with mobility/ambulation  - Relieve pressure over bony prominences  - Avoid friction and shearing  - Provide appropriate hygiene as needed including keeping skin clean and dry  - Evaluate need for skin moisturizer/barrier cream  - Collaborate with interdisciplinary team   - Patient/family teaching  - Consider wound care consult   Outcome: Progressing     Problem: Nutrition/Hydration-ADULT  Goal: Nutrient/Hydration intake appropriate for improving, restoring or maintaining nutritional needs  Description: Monitor and assess patient's nutrition/hydration status for malnutrition  Collaborate with interdisciplinary team and initiate plan and interventions as ordered  Monitor patient's weight and dietary intake as ordered or per policy  Utilize nutrition screening tool and intervene as necessary  Determine patient's food preferences and provide high-protein, high-caloric foods as appropriate       INTERVENTIONS:  - Monitor oral intake, urinary output, labs, and treatment plans  - Assess nutrition and hydration status and recommend course of action  - Evaluate amount of meals eaten  - Assist patient with eating if necessary   - Allow adequate time for meals  - Recommend/ encourage appropriate diets, oral nutritional supplements, and vitamin/mineral supplements  - Order, calculate, and assess calorie counts as needed  - Recommend, monitor, and adjust tube feedings based on assessed needs  - Assess need for intravenous fluids  - Provide nutrition/hydration education as appropriate  - Include patient/family/caregiver in decisions related to nutrition  Outcome: Progressing     Problem: Potential for Falls  Goal: Patient will remain free of falls  Description: INTERVENTIONS:  - Assess patient frequently for physical needs  -  Identify cognitive and physical deficits and behaviors that affect risk of falls  -  [place] fall precautions as indicated by assessment   - Educate patient/family on patient safety including physical limitations  - Instruct patient to call for assistance with activity based on assessment  - Modify environment to reduce risk of injury  - Consider OT/PT consult to assist with strengthening/mobility  Outcome: Progressing     Problem: DISCHARGE PLANNING  Goal: Discharge to home or other facility with appropriate resources  Description: INTERVENTIONS:  - Identify barriers to discharge w/patient and caregiver  - Arrange for needed discharge resources and transportation as appropriate  - Identify discharge learning needs (meds, wound care, etc )  - Arrange for interpretive services to assist at discharge as needed  - Refer to Case Management Department for coordinating discharge planning if the patient needs post-hospital services based on physician/advanced practitioner order or complex needs related to functional status, cognitive ability, or social support system  Outcome: Progressing     Problem: Knowledge Deficit  Goal: Patient/family/caregiver demonstrates understanding of disease process, treatment plan, medications, and discharge instructions  Description: Complete learning assessment and assess knowledge base    Interventions:  - Provide teaching at level of understanding  - Provide teaching via preferred learning methods  Outcome: Progressing     Problem: RESPIRATORY - ADULT  Goal: Achieves optimal ventilation and oxygenation  Description: INTERVENTIONS:  - Assess for changes in respiratory status  - Assess for changes in mentation and behavior  - Position to facilitate oxygenation and minimize respiratory effort  - Oxygen administered by appropriate delivery if ordered  - Initiate smoking cessation education as indicated  - Encourage broncho-pulmonary hygiene including cough, deep breathe, Incentive Spirometry  - Assess the need for suctioning and aspirate as needed  - Assess and instruct to report SOB or any respiratory difficulty  - Respiratory Therapy support as indicated  Outcome: Progressing     Problem: GASTROINTESTINAL - ADULT  Goal: Minimal or absence of nausea and/or vomiting  Description: INTERVENTIONS:  - Administer IV fluids if ordered to ensure adequate hydration  - Maintain NPO status until nausea and vomiting are resolved  - Nasogastric tube if ordered  - Administer ordered antiemetic medications as needed  - Provide nonpharmacologic comfort measures as appropriate  - Advance diet as tolerated, if ordered  - Consider nutrition services referral to assist patient with adequate nutrition and appropriate food choices  Outcome: Progressing  Goal: Maintains or returns to baseline bowel function  Description: INTERVENTIONS:  - Assess bowel function  - Encourage oral fluids to ensure adequate hydration  - Administer IV fluids if ordered to ensure adequate hydration  - Administer ordered medications as needed  - Encourage mobilization and activity  - Consider nutritional services referral to assist patient with adequate nutrition and appropriate food choices  Outcome: Progressing

## 2021-04-08 NOTE — ASSESSMENT & PLAN NOTE
· Likely from steroid    A1c 11 6%  · Glucose levels were poorly controlled but now improving, continue current regimen     Results from last 7 days   Lab Units 04/08/21  0504 04/07/21  0520 04/06/21  0601 04/05/21  1752 04/05/21  0552 04/04/21  0509 04/03/21  0218   GLUCOSE RANDOM mg/dL 89 124 83 508* 370* 348* 274*

## 2021-04-08 NOTE — CASE MANAGEMENT
Pt is a tentative d/c in 24-48hrs per [facility] (Dr Anju Gilbert)  Her O2 requirements are dropping    Will need Home O2 Eval prior to d/c

## 2021-04-08 NOTE — PROGRESS NOTES
Progress Note - Pulmonary   Steven Sher 63 y o  female MRN: [id number]  Unit/Bed#: -01 Encounter: [id number]    Assessment:  Acute hypoxemic respiratory failure  COVID-19 pneumonia  C diff colitis    Plan:  I was able to titrate patient down to 1 5 L by nasal cannula on exam today  Continue to monitor SpO2 and titrate to maintain saturations greater than 89%  Continue treatment per pathway  Vitamin-C, D, zinc, multivitamin  Atorvastatin, famotidine  Dexamethasone x 10 days  Remdesivir x5 days  Anticoagulation:  Per protocol  Antibiotics:  Ceftriaxone and doxycycline  Encourage self-proning, activity as tolerated, incentive spirometry  Ongoing monitoring of inflammatory markers and D-dimer    Discussed with [facility]    Please call with any questions    Subjective:   Patient seen and examined  She states that her breathing is much better  She feels that she will be ready to go home about 2 days  She has no new complaints  No chest pain  12 point review of systems otherwise negative  Objective:     Vitals: Blood pressure 119/53, pulse 63, temperature 98 3 °F (36 8 °C), temperature source Oral, resp  rate 18, height 5' 5" (1 651 m), weight 81 2 kg (179 lb), SpO2 90 %  ,Body mass index is 29 79 kg/m²  Intake/Output Summary (Last 24 hours) at 4/8/2021 1410  Last data filed at 4/8/2021 0300  Gross per 24 hour   Intake 120 ml   Output --   Net 120 ml       Invasive Devices     Peripheral Intravenous Line            Peripheral IV 04/06/21 Dorsal (posterior); Left Forearm 1 day                Physical Exam:   General appearance: alert and oriented, in no acute distress  Head: Normocephalic, without obvious abnormality, atraumatic  Eyes: EOMI  No discharge bilaterally  No scleral icterus     Neck: supple, symmetrical, trachea midline  Lungs:  Decreased breath sounds  Heart: regular rate and rhythm, S1, S2 normal, no murmur, click, rub or gallop  Abdomen:  No appreciable distension or tenderness  Extremities: No edema or tenderness  Skin: No lesions or pallor noted  No rash  Neurologic: Grossly normal     Labs: I have personally reviewed pertinent lab results  Imaging and other studies: I have personally reviewed pertinent reports

## 2021-04-08 NOTE — ASSESSMENT & PLAN NOTE
· In the setting of COVID-19, Improving   · Currently saturating at 94% on 1 5 L via nasal cannula, wean as tolerated  · Respiratory protocol  · Pulmonology following

## 2021-04-08 NOTE — PROGRESS NOTES
[facility]  Progress Note Dong Carter 1960, 63 y o  female MRN: [id number]  Unit/Bed#: -01 Encounter: [id number]  Primary Care Provider: No primary care provider on file  Date and time admitted to hospital: 4/3/2021  2:12 AM    Type 2 diabetes mellitus ([address] )  Assessment & Plan  · Likely from steroid  A1c 11 6%  · Glucose levels were poorly controlled but now improving, continue current regimen     Results from last 7 days   Lab Units 04/08/21  0504 04/07/21  0520 04/06/21  0601 04/05/21  1752 04/05/21  0552 04/04/21  0509 04/03/21  0218   GLUCOSE RANDOM mg/dL 89 124 83 508* 370* 348* 274*         C  difficile colitis  Assessment & Plan  · Started on oral vancomycin  · No previous history  Will continue for 10 days total    Abdominal pain  Assessment & Plan  Patient presents with generalized abdominal pain, nausea, vomiting, and diarrhea  · In the setting of COVID-19  · LFTs within normal limits  · Her C diff also positive  · Abdominal pain and diarrhea improved now  · Continue PO vanomycin with plan for 10 day course    Sepsis [facility])  Assessment & Plan  Patient meeting sepsis criteria with tachypnea, tachycardia  In the setting of COVID-19  · Moderate COVID-19 pathway  · Procalcitonin elevated  Continue IV antibiotics also    · Blood culture no growth in 72+ hours    Acute respiratory failure (HCC)  Assessment & Plan  · In the setting of COVID-19, Improving   · Currently saturating at 94% on 1 5 L via nasal cannula, wean as tolerated  · Respiratory protocol  · Pulmonology following     * Pneumonia due to COVID-19 virus  Assessment & Plan   Moderate covid 19 infection   Continue with IV antibiotics x 7 days due to elevated procalcitonin   Continue IV decadron   Completed course of remdesivir    Continue lovenox D dimer max <1   Monitor inflammatory markers and oxygen status   Improving          VTE Pharmacologic Prophylaxis:   Pharmacologic: Enoxaparin (Lovenox)  Mechanical VTE Prophylaxis in Place: Yes    Patient Centered Rounds: I have performed bedside rounds with nursing staff today  Discussions with Specialists or Other Care Team Provider: Pulmonology     Education and Discussions with Family / Patient: Called , no answer     Time Spent for Care: 30 minutes  More than 50% of total time spent on counseling and coordination of care as described above  Current Length of Stay: 5 day(s)    Current Patient Status: Inpatient   Certification Statement: The patient will continue to require additional inpatient hospital stay due to hypoxic respiratory failure    Discharge Plan: 24-48 hours     Code Status: Level 1 - Full Code      Subjective:   Patient feels better today, no chest pain or chest palpitations  Shortness of breath improving  Appetite is better  Objective:     Vitals:   Temp (24hrs), Av 3 °F (36 8 °C), Min:98 1 °F (36 7 °C), Max:98 4 °F (36 9 °C)    Temp:  [98 1 °F (36 7 °C)-98 4 °F (36 9 °C)] 98 3 °F (36 8 °C)  HR:  [60-63] 63  Resp:  [18-19] 18  BP: (119-132)/(53-69) 119/53  SpO2:  [90 %-95 %] 90 %  Body mass index is 29 79 kg/m²  Input and Output Summary (last 24 hours): Intake/Output Summary (Last 24 hours) at 2021 1407  Last data filed at 2021 0300  Gross per 24 hour   Intake 120 ml   Output --   Net 120 ml       Physical Exam:     Physical Exam  Vitals signs and nursing note reviewed  Constitutional:       Appearance: Normal appearance  HENT:      Head: Normocephalic and atraumatic  Nose: Nose normal  No congestion  Mouth/Throat:      Mouth: Mucous membranes are dry  Pharynx: Oropharynx is clear  Eyes:      General:         Right eye: No discharge  Left eye: No discharge  Neck:      Musculoskeletal: Normal range of motion and neck supple  Cardiovascular:      Rate and Rhythm: Normal rate and regular rhythm     Pulmonary:      Effort: Pulmonary effort is normal  No respiratory distress  Comments: On 1 5 liters nasal canula   Abdominal:      General: Abdomen is flat  Palpations: Abdomen is soft  Musculoskeletal:      Right lower leg: No edema  Left lower leg: No edema  Skin:     General: Skin is warm and dry  Neurological:      General: No focal deficit present  Mental Status: She is alert  Mental status is at baseline  Psychiatric:         Mood and Affect: Mood normal          Behavior: Behavior normal            Additional Data:     Labs:    Results from last 7 days   Lab Units 04/08/21  0504  04/06/21  0601   WBC Thousand/uL 16 35*   < > 13 34*   HEMOGLOBIN g/dL 14 2   < > 14 9   HEMATOCRIT % 42 6   < > 46 0   PLATELETS Thousands/uL 369   < > 434*   BANDS PCT %  --   --  1   LYMPHO PCT %  --   --  31   MONO PCT %  --   --  4   EOS PCT %  --   --  0    < > = values in this interval not displayed  Results from last 7 days   Lab Units 04/08/21  0504   SODIUM mmol/L 141   POTASSIUM mmol/L 3 4*   CHLORIDE mmol/L 106   CO2 mmol/L 27   BUN mg/dL 21   CREATININE mg/dL 0 69   ANION GAP mmol/L 8   CALCIUM mg/dL 8 4   ALBUMIN g/dL 2 1*   TOTAL BILIRUBIN mg/dL 0 31   ALK PHOS U/L 57   ALT U/L 25   AST U/L 14   GLUCOSE RANDOM mg/dL 89         Results from last 7 days   Lab Units 04/08/21  1100 04/08/21  0733 04/07/21  2035 04/07/21  1539 04/07/21  1124 04/07/21  0729 04/06/21  2047 04/06/21  1536 04/06/21  1110 04/06/21  0755 04/05/21  2037 04/05/21  1854   POC GLUCOSE mg/dl 351* 117 310* 372* 458* 206* 335* 362* 331* 147* 374* 448*     Results from last 7 days   Lab Units 04/05/21  1752   HEMOGLOBIN A1C % 11 6*     Results from last 7 days   Lab Units 04/04/21  0509 04/03/21  0218   LACTIC ACID mmol/L  --  1 8   PROCALCITONIN ng/ml 1 11* 2 56*           * I Have Reviewed All Lab Data Listed Above  * Additional Pertinent Lab Tests Reviewed:  All Labs For Current Hospital Admission Reviewed    Imaging:    Imaging Reports Reviewed Today Include: NA  Imaging Personally Reviewed by Myself Includes:  NA    Recent Cultures (last 7 days):     Results from last 7 days   Lab Units 04/03/21  1228 04/03/21  0227 04/03/21  0218   BLOOD CULTURE   --  No Growth After 5 Days  No Growth After 5 Days  C DIFF TOXIN B BY PCR  Positive*  Positive*  --   --        Last 24 Hours Medication List:   Current Facility-Administered Medications   Medication Dose Route Frequency Provider Last Rate    acetaminophen  650 mg Oral Q6H PRN Kyle Asp, PA-C      ascorbic acid  1,000 mg Oral Q12H [address] Kyle Asp, PA-C      cefTRIAXone  1,000 mg Intravenous Q24H Kyle Asp, PA-C 1,000 mg (04/07/21 5277)    cholecalciferol  2,000 Units Oral Daily Kyle Asp, PA-C      dexamethasone  6 mg Intravenous Q24H Kyle Asp, PA-C      dicyclomine  10 mg Oral 4x Daily PRN Kyle Asp, PA-C      docusate sodium  100 mg Oral BID Kyle Asp, PA-C      doxycycline hyclate  100 mg Oral Q12H Kyle Asp, PA-C      enoxaparin  30 mg Subcutaneous Q12H [address] Kyle Asp, [place]      guaiFENesin  600 mg Oral Q12H [address] Kyle Asp, PA-C      insulin lispro  2-12 Units Subcutaneous TID AC Delfina Schuler MD      insulin lispro  2-12 Units Subcutaneous HS Delfina Schuler MD      lactobacillus acidophilus-bulgaricus  1 packet Oral TID With Meals Delfina Schuler MD      metoclopramide  10 mg Intravenous Q6H PRN Kyle Asp, PA-C      zinc sulfate  220 mg Oral Daily Kyle Asp, PA-C      Followed by   Marycruz Sotelo ON 4/10/2021] multivitamin-minerals  1 tablet Oral Daily Kyle Asp, PA-C      ondansetron  4 mg Intravenous Q6H PRN Kyle Asp, PA-C      vancomycin  125 mg Oral Q6H Chari Arroyo MD          Today, Patient Was Seen By: LISA Alford      ** Please Note: Dictation voice to text software may have been used in the creation of this document   **

## 2021-04-09 PROBLEM — J96.01 ACUTE RESPIRATORY FAILURE WITH HYPOXIA (HCC): Status: ACTIVE | Noted: 2021-04-03

## 2021-04-09 LAB
ALBUMIN SERPL BCP-MCNC: 2 G/DL (ref 3.5–5)
ALP SERPL-CCNC: 55 U/L (ref 46–116)
ALT SERPL W P-5'-P-CCNC: 21 U/L (ref 12–78)
ANION GAP SERPL CALCULATED.3IONS-SCNC: 8 MMOL/L (ref 4–13)
AST SERPL W P-5'-P-CCNC: 13 U/L (ref 5–45)
BILIRUB SERPL-MCNC: 0.31 MG/DL (ref 0.2–1)
BUN SERPL-MCNC: 18 MG/DL (ref 5–25)
CALCIUM ALBUM COR SERPL-MCNC: 10.1 MG/DL (ref 8.3–10.1)
CALCIUM SERPL-MCNC: 8.5 MG/DL (ref 8.3–10.1)
CHLORIDE SERPL-SCNC: 105 MMOL/L (ref 100–108)
CO2 SERPL-SCNC: 26 MMOL/L (ref 21–32)
CREAT SERPL-MCNC: 0.63 MG/DL (ref 0.6–1.3)
CRP SERPL QL: 14.1 MG/L
D DIMER PPP FEU-MCNC: 0.38 UG/ML FEU
ERYTHROCYTE [DISTWIDTH] IN BLOOD BY AUTOMATED COUNT: 12.7 % (ref 11.6–15.1)
FERRITIN SERPL-MCNC: 1016 NG/ML (ref 8–388)
GFR SERPL CREATININE-BSD FRML MDRD: 97 ML/MIN/1.73SQ M
GLUCOSE SERPL-MCNC: 143 MG/DL (ref 65–140)
GLUCOSE SERPL-MCNC: 196 MG/DL (ref 65–140)
GLUCOSE SERPL-MCNC: 339 MG/DL (ref 65–140)
GLUCOSE SERPL-MCNC: 351 MG/DL (ref 65–140)
GLUCOSE SERPL-MCNC: 370 MG/DL (ref 65–140)
HCT VFR BLD AUTO: 42.4 % (ref 34.8–46.1)
HGB BLD-MCNC: 14.1 G/DL (ref 11.5–15.4)
MAGNESIUM SERPL-MCNC: 1.9 MG/DL (ref 1.6–2.6)
MCH RBC QN AUTO: 30.1 PG (ref 26.8–34.3)
MCHC RBC AUTO-ENTMCNC: 33.3 G/DL (ref 31.4–37.4)
MCV RBC AUTO: 90 FL (ref 82–98)
NRBC BLD AUTO-RTO: 0 /100 WBCS
PLATELET # BLD AUTO: 363 THOUSANDS/UL (ref 149–390)
PMV BLD AUTO: 10.2 FL (ref 8.9–12.7)
POTASSIUM SERPL-SCNC: 3.9 MMOL/L (ref 3.5–5.3)
PROT SERPL-MCNC: 5.3 G/DL (ref 6.4–8.2)
RBC # BLD AUTO: 4.69 MILLION/UL (ref 3.81–5.12)
SODIUM SERPL-SCNC: 139 MMOL/L (ref 136–145)
WBC # BLD AUTO: 16.35 THOUSAND/UL (ref 4.31–10.16)

## 2021-04-09 PROCEDURE — 80053 COMPREHEN METABOLIC PANEL: CPT | Performed by: STUDENT IN AN ORGANIZED HEALTH CARE EDUCATION/TRAINING PROGRAM

## 2021-04-09 PROCEDURE — 99233 SBSQ HOSP IP/OBS HIGH 50: CPT | Performed by: STUDENT IN AN ORGANIZED HEALTH CARE EDUCATION/TRAINING PROGRAM

## 2021-04-09 PROCEDURE — 85027 COMPLETE CBC AUTOMATED: CPT | Performed by: STUDENT IN AN ORGANIZED HEALTH CARE EDUCATION/TRAINING PROGRAM

## 2021-04-09 PROCEDURE — 85379 FIBRIN DEGRADATION QUANT: CPT | Performed by: STUDENT IN AN ORGANIZED HEALTH CARE EDUCATION/TRAINING PROGRAM

## 2021-04-09 PROCEDURE — 83735 ASSAY OF MAGNESIUM: CPT | Performed by: STUDENT IN AN ORGANIZED HEALTH CARE EDUCATION/TRAINING PROGRAM

## 2021-04-09 PROCEDURE — 82728 ASSAY OF FERRITIN: CPT | Performed by: STUDENT IN AN ORGANIZED HEALTH CARE EDUCATION/TRAINING PROGRAM

## 2021-04-09 PROCEDURE — 86140 C-REACTIVE PROTEIN: CPT | Performed by: STUDENT IN AN ORGANIZED HEALTH CARE EDUCATION/TRAINING PROGRAM

## 2021-04-09 PROCEDURE — 82948 REAGENT STRIP/BLOOD GLUCOSE: CPT

## 2021-04-09 PROCEDURE — 94761 N-INVAS EAR/PLS OXIMETRY MLT: CPT

## 2021-04-09 RX ORDER — FUROSEMIDE 10 MG/ML
20 INJECTION INTRAMUSCULAR; INTRAVENOUS ONCE
Status: COMPLETED | OUTPATIENT
Start: 2021-04-09 | End: 2021-04-09

## 2021-04-09 RX ADMIN — ZINC SULFATE 220 MG (50 MG) CAPSULE 220 MG: CAPSULE at 09:43

## 2021-04-09 RX ADMIN — GUAIFENESIN 600 MG: 600 TABLET ORAL at 21:55

## 2021-04-09 RX ADMIN — Medication 2000 UNITS: at 09:43

## 2021-04-09 RX ADMIN — INSULIN LISPRO 8 UNITS: 100 INJECTION, SOLUTION INTRAVENOUS; SUBCUTANEOUS at 21:55

## 2021-04-09 RX ADMIN — INSULIN LISPRO 10 UNITS: 100 INJECTION, SOLUTION INTRAVENOUS; SUBCUTANEOUS at 11:53

## 2021-04-09 RX ADMIN — OXYCODONE HYDROCHLORIDE AND ACETAMINOPHEN 1000 MG: 500 TABLET ORAL at 09:43

## 2021-04-09 RX ADMIN — Medication 125 MG: at 04:53

## 2021-04-09 RX ADMIN — Medication 125 MG: at 12:52

## 2021-04-09 RX ADMIN — ENOXAPARIN SODIUM 30 MG: 30 INJECTION SUBCUTANEOUS at 21:55

## 2021-04-09 RX ADMIN — FUROSEMIDE 20 MG: 10 INJECTION, SOLUTION INTRAMUSCULAR; INTRAVENOUS at 14:46

## 2021-04-09 RX ADMIN — INSULIN LISPRO 2 UNITS: 100 INJECTION, SOLUTION INTRAVENOUS; SUBCUTANEOUS at 07:43

## 2021-04-09 RX ADMIN — INSULIN LISPRO 10 UNITS: 100 INJECTION, SOLUTION INTRAVENOUS; SUBCUTANEOUS at 16:55

## 2021-04-09 RX ADMIN — OXYCODONE HYDROCHLORIDE AND ACETAMINOPHEN 1000 MG: 500 TABLET ORAL at 21:55

## 2021-04-09 RX ADMIN — GUAIFENESIN 600 MG: 600 TABLET ORAL at 09:43

## 2021-04-09 RX ADMIN — ENOXAPARIN SODIUM 30 MG: 30 INJECTION SUBCUTANEOUS at 09:43

## 2021-04-09 RX ADMIN — Medication 125 MG: at 17:54

## 2021-04-09 RX ADMIN — DOXYCYCLINE 100 MG: 100 CAPSULE ORAL at 04:46

## 2021-04-09 RX ADMIN — MELATONIN TAB 3 MG 6 MG: 3 TAB at 21:55

## 2021-04-09 RX ADMIN — DEXAMETHASONE SODIUM PHOSPHATE 6 MG: 4 INJECTION, SOLUTION INTRA-ARTICULAR; INTRALESIONAL; INTRAMUSCULAR; INTRAVENOUS; SOFT TISSUE at 04:47

## 2021-04-09 NOTE — ASSESSMENT & PLAN NOTE
· Likely from steroid    A1c 11 6%  · Glucose levels were poorly controlled but now improving, continue current regimen     Results from last 7 days   Lab Units 04/09/21  0438 04/08/21  0504 04/07/21  0520 04/06/21  0601 04/05/21  1752 04/05/21  0552 04/04/21  0509   GLUCOSE RANDOM mg/dL 143* 89 124 83 508* 370* 348*

## 2021-04-09 NOTE — ASSESSMENT & PLAN NOTE
Patient presents with generalized abdominal pain, nausea, vomiting, and diarrhea  · In the setting of COVID-19  · LFTs within normal limits  · Her C diff also positive    · Abdominal pain and diarrhea resolved  · Continue PO vanomycin with plan for 10 day course

## 2021-04-09 NOTE — ASSESSMENT & PLAN NOTE
 Moderate covid 19 infection   Completed 7 day course of antibiotics    Continue IV decadron   Completed course of remdesivir    Continue lovenox D dimer max <1   Monitor inflammatory markers and oxygen status   Improving

## 2021-04-09 NOTE — ASSESSMENT & PLAN NOTE
Patient meeting sepsis criteria with tachypnea, tachycardia  In the setting of COVID-19  · Moderate COVID-19 pathway    · Completed course of IV antibiotics   · Blood culture no growth in 72+ hours

## 2021-04-09 NOTE — PLAN OF CARE
Problem: Prexisting or High Potential for Compromised Skin Integrity  Goal: Skin integrity is maintained or improved  Description: INTERVENTIONS:  - Identify patients at risk for skin breakdown  - Assess and monitor skin integrity  - Assess and monitor nutrition and hydration status  - Monitor labs   - Assess for incontinence   - Turn and reposition patient  - Assist with mobility/ambulation  - Relieve pressure over bony prominences  - Avoid friction and shearing  - Provide appropriate hygiene as needed including keeping skin clean and dry  - Evaluate need for skin moisturizer/barrier cream  - Collaborate with interdisciplinary team   - Patient/family teaching  - Consider wound care consult   Outcome: Progressing     Problem: Nutrition/Hydration-ADULT  Goal: Nutrient/Hydration intake appropriate for improving, restoring or maintaining nutritional needs  Description: Monitor and assess patient's nutrition/hydration status for malnutrition  Collaborate with interdisciplinary team and initiate plan and interventions as ordered  Monitor patient's weight and dietary intake as ordered or per policy  Utilize nutrition screening tool and intervene as necessary  Determine patient's food preferences and provide high-protein, high-caloric foods as appropriate       INTERVENTIONS:  - Monitor oral intake, urinary output, labs, and treatment plans  - Assess nutrition and hydration status and recommend course of action  - Evaluate amount of meals eaten  - Assist patient with eating if necessary   - Allow adequate time for meals  - Recommend/ encourage appropriate diets, oral nutritional supplements, and vitamin/mineral supplements  - Order, calculate, and assess calorie counts as needed  - Recommend, monitor, and adjust tube feedings based on assessed needs  - Assess need for intravenous fluids  - Provide nutrition/hydration education as appropriate  - Include patient/family/caregiver in decisions related to nutrition  Outcome: Progressing     Problem: Potential for Falls  Goal: Patient will remain free of falls  Description: INTERVENTIONS:  - Assess patient frequently for physical needs  -  Identify cognitive and physical deficits and behaviors that affect risk of falls  -  [place] fall precautions as indicated by assessment   - Educate patient/family on patient safety including physical limitations  - Instruct patient to call for assistance with activity based on assessment  - Modify environment to reduce risk of injury  - Consider OT/PT consult to assist with strengthening/mobility  Outcome: Progressing     Problem: DISCHARGE PLANNING  Goal: Discharge to home or other facility with appropriate resources  Description: INTERVENTIONS:  - Identify barriers to discharge w/patient and caregiver  - Arrange for needed discharge resources and transportation as appropriate  - Identify discharge learning needs (meds, wound care, etc )  - Arrange for interpretive services to assist at discharge as needed  - Refer to Case Management Department for coordinating discharge planning if the patient needs post-hospital services based on physician/advanced practitioner order or complex needs related to functional status, cognitive ability, or social support system  Outcome: Progressing     Problem: Knowledge Deficit  Goal: Patient/family/caregiver demonstrates understanding of disease process, treatment plan, medications, and discharge instructions  Description: Complete learning assessment and assess knowledge base    Interventions:  - Provide teaching at level of understanding  - Provide teaching via preferred learning methods  Outcome: Progressing     Problem: RESPIRATORY - ADULT  Goal: Achieves optimal ventilation and oxygenation  Description: INTERVENTIONS:  - Assess for changes in respiratory status  - Assess for changes in mentation and behavior  - Position to facilitate oxygenation and minimize respiratory effort  - Oxygen administered by appropriate delivery if ordered  - Initiate smoking cessation education as indicated  - Encourage broncho-pulmonary hygiene including cough, deep breathe, Incentive Spirometry  - Assess the need for suctioning and aspirate as needed  - Assess and instruct to report SOB or any respiratory difficulty  - Respiratory Therapy support as indicated  Outcome: Progressing     Problem: GASTROINTESTINAL - ADULT  Goal: Minimal or absence of nausea and/or vomiting  Description: INTERVENTIONS:  - Administer IV fluids if ordered to ensure adequate hydration  - Maintain NPO status until nausea and vomiting are resolved  - Nasogastric tube if ordered  - Administer ordered antiemetic medications as needed  - Provide nonpharmacologic comfort measures as appropriate  - Advance diet as tolerated, if ordered  - Consider nutrition services referral to assist patient with adequate nutrition and appropriate food choices  Outcome: Progressing  Goal: Maintains or returns to baseline bowel function  Description: INTERVENTIONS:  - Assess bowel function  - Encourage oral fluids to ensure adequate hydration  - Administer IV fluids if ordered to ensure adequate hydration  - Administer ordered medications as needed  - Encourage mobilization and activity  - Consider nutritional services referral to assist patient with adequate nutrition and appropriate food choices  Outcome: Progressing

## 2021-04-09 NOTE — ASSESSMENT & PLAN NOTE
· In the setting of COVID-19, Improving   · Currently saturating at 94% on 1 5 L via nasal cannula, wean as tolerated  · Was planning for discharge today but on home 02 evaluation, required 5 liters nasal canula  · Given above oxygen requirements, would recommend further inpatient management   · Given dose of lasix IV for any residual vascular congestion

## 2021-04-09 NOTE — CASE MANAGEMENT
Home O2 Eval done and pt's AQI9Q are only  91% on 5 lpm-exertion  Dr Leila Aguirre feels pt should be held until SaO2s are more stable with exertion  Pt's  is intubated at [facility] with Covid 19 and will not be able to provide any assistance to her on discharge  Mohs Method Verbiage: An incision at a 45 degree angle following the standard Mohs approach was done and the specimen was harvested as a microscopic controlled layer.

## 2021-04-09 NOTE — PLAN OF CARE
Problem: Prexisting or High Potential for Compromised Skin Integrity  Goal: Skin integrity is maintained or improved  Description: INTERVENTIONS:  - Identify patients at risk for skin breakdown  - Assess and monitor skin integrity  - Assess and monitor nutrition and hydration status  - Monitor labs   - Assess for incontinence   - Turn and reposition patient  - Assist with mobility/ambulation  - Relieve pressure over bony prominences  - Avoid friction and shearing  - Provide appropriate hygiene as needed including keeping skin clean and dry  - Evaluate need for skin moisturizer/barrier cream  - Collaborate with interdisciplinary team   - Patient/family teaching  - Consider wound care consult   Outcome: Progressing     Problem: Nutrition/Hydration-ADULT  Goal: Nutrient/Hydration intake appropriate for improving, restoring or maintaining nutritional needs  Description: Monitor and assess patient's nutrition/hydration status for malnutrition  Collaborate with interdisciplinary team and initiate plan and interventions as ordered  Monitor patient's weight and dietary intake as ordered or per policy  Utilize nutrition screening tool and intervene as necessary  Determine patient's food preferences and provide high-protein, high-caloric foods as appropriate       INTERVENTIONS:  - Monitor oral intake, urinary output, labs, and treatment plans  - Assess nutrition and hydration status and recommend course of action  - Evaluate amount of meals eaten  - Assist patient with eating if necessary   - Allow adequate time for meals  - Recommend/ encourage appropriate diets, oral nutritional supplements, and vitamin/mineral supplements  - Order, calculate, and assess calorie counts as needed  - Recommend, monitor, and adjust tube feedings based on assessed needs  - Assess need for intravenous fluids  - Provide nutrition/hydration education as appropriate  - Include patient/family/caregiver in decisions related to nutrition  Outcome: Progressing     Problem: Potential for Falls  Goal: Patient will remain free of falls  Description: INTERVENTIONS:  - Assess patient frequently for physical needs  -  Identify cognitive and physical deficits and behaviors that affect risk of falls  -  [place] fall precautions as indicated by assessment   - Educate patient/family on patient safety including physical limitations  - Instruct patient to call for assistance with activity based on assessment  - Modify environment to reduce risk of injury  - Consider OT/PT consult to assist with strengthening/mobility  Outcome: Progressing     Problem: DISCHARGE PLANNING  Goal: Discharge to home or other facility with appropriate resources  Description: INTERVENTIONS:  - Identify barriers to discharge w/patient and caregiver  - Arrange for needed discharge resources and transportation as appropriate  - Identify discharge learning needs (meds, wound care, etc )  - Arrange for interpretive services to assist at discharge as needed  - Refer to Case Management Department for coordinating discharge planning if the patient needs post-hospital services based on physician/advanced practitioner order or complex needs related to functional status, cognitive ability, or social support system  Outcome: Progressing     Problem: Knowledge Deficit  Goal: Patient/family/caregiver demonstrates understanding of disease process, treatment plan, medications, and discharge instructions  Description: Complete learning assessment and assess knowledge base    Interventions:  - Provide teaching at level of understanding  - Provide teaching via preferred learning methods  Outcome: Progressing     Problem: RESPIRATORY - ADULT  Goal: Achieves optimal ventilation and oxygenation  Description: INTERVENTIONS:  - Assess for changes in respiratory status  - Assess for changes in mentation and behavior  - Position to facilitate oxygenation and minimize respiratory effort  - Oxygen administered by appropriate delivery if ordered  - Initiate smoking cessation education as indicated  - Encourage broncho-pulmonary hygiene including cough, deep breathe, Incentive Spirometry  - Assess the need for suctioning and aspirate as needed  - Assess and instruct to report SOB or any respiratory difficulty  - Respiratory Therapy support as indicated  Outcome: Progressing     Problem: GASTROINTESTINAL - ADULT  Goal: Minimal or absence of nausea and/or vomiting  Description: INTERVENTIONS:  - Administer IV fluids if ordered to ensure adequate hydration  - Maintain NPO status until nausea and vomiting are resolved  - Nasogastric tube if ordered  - Administer ordered antiemetic medications as needed  - Provide nonpharmacologic comfort measures as appropriate  - Advance diet as tolerated, if ordered  - Consider nutrition services referral to assist patient with adequate nutrition and appropriate food choices  Outcome: Progressing  Goal: Maintains or returns to baseline bowel function  Description: INTERVENTIONS:  - Assess bowel function  - Encourage oral fluids to ensure adequate hydration  - Administer IV fluids if ordered to ensure adequate hydration  - Administer ordered medications as needed  - Encourage mobilization and activity  - Consider nutritional services referral to assist patient with adequate nutrition and appropriate food choices  Outcome: Progressing

## 2021-04-09 NOTE — RESPIRATORY THERAPY NOTE
Home Oxygen Qualifying Test       Patient name: Dory Deras        :    Date of Test:  2021  Diagnosis: Covid 19 pneumonia     Home Oxygen Test:    **Medicare Guidelines require item(s) 1-5 on all ambulatory patients or 1 and 2 on non-ambulatory patients  1   Baseline SPO2 on Room Air at rest 91 %  2   SPO2 during exercise on Room Air 87 %  During exercise monitor SpO2  If SPO2 increases >=89% with ambulation do not add supplemental             oxygen  If <= 88% on room air add O2 via NC and titrate patient  Patient must be ambulated with O2 and titrated to > 88% with exertion  3   SPO2 on Oxygen at rest 94 % 2 lpm     4   SPO2 during exercise on Oxygen  91% a liter flow of 5 lpm     5   Exercise performed:          walking, duration 6 (min)          [x]  Supplemental Home Oxygen is indicated  []  Client does not qualify for home oxygen        Respiratory Additional Notes-   Nicholas Ivan, RT

## 2021-04-09 NOTE — PROGRESS NOTES
[address] [facility]  Progress Note José Miguel Lyle 1960, 63 y o  female MRN: [id number]  Unit/Bed#: -01 Encounter: [id number]  Primary Care Provider: No primary care provider on file  Date and time admitted to hospital: 4/3/2021  2:12 AM    Acute respiratory failure with hypoxia (HCC)  Assessment & Plan  · In the setting of COVID-19, Improving   · Currently saturating at 94% on 1 5 L via nasal cannula, wean as tolerated  · Was planning for discharge today but on home 02 evaluation, required 5 liters nasal canula  · Given above oxygen requirements, would recommend further inpatient management   · Given dose of lasix IV for any residual vascular congestion     Type 2 diabetes mellitus (HCC)  Assessment & Plan  · Likely from steroid  A1c 11 6%  · Glucose levels were poorly controlled but now improving, continue current regimen     Results from last 7 days   Lab Units 04/09/21  0438 04/08/21  0504 04/07/21  0520 04/06/21  0601 04/05/21  1752 04/05/21  0552 04/04/21  0509   GLUCOSE RANDOM mg/dL 143* 89 124 83 508* 370* 348*         C  difficile colitis  Assessment & Plan  · Started on oral vancomycin  · No previous history  Will continue for 10 days total    Abdominal pain  Assessment & Plan  Patient presents with generalized abdominal pain, nausea, vomiting, and diarrhea  · In the setting of COVID-19  · LFTs within normal limits  · Her C diff also positive  · Abdominal pain and diarrhea resolved  · Continue PO vanomycin with plan for 10 day course    Sepsis [facility])  Assessment & Plan  Patient meeting sepsis criteria with tachypnea, tachycardia  In the setting of COVID-19  · Moderate COVID-19 pathway    · Completed course of IV antibiotics   · Blood culture no growth in 72+ hours    * Pneumonia due to COVID-19 virus  Assessment & Plan   Moderate covid 19 infection   Completed 7 day course of antibiotics    Continue IV decadron   Completed course of remdesivir    Continue lovenox D dimer max <1   Monitor inflammatory markers and oxygen status   Improving      VTE Pharmacologic Prophylaxis:   Pharmacologic: Enoxaparin (Lovenox)  Mechanical VTE Prophylaxis in Place: Yes    Patient Centered Rounds: I have performed bedside rounds with nursing staff today  Discussions with Specialists or Other Care Team Provider: Pulmonology     Time Spent for Care: 30 minutes  More than 50% of total time spent on counseling and coordination of care as described above  Current Length of Stay: 6 day(s)    Current Patient Status: Inpatient   Certification Statement: The patient will continue to require additional inpatient hospital stay due to hypoxic respiratory failure    Discharge Plan: 24-48 hours     Code Status: Level 1 - Full Code      Subjective:   Patient feels much improved  No chest pain or chest palpitations  Shortness of breath is improving  Appetite is good  Diarrhea has resolved  Objective:     Vitals:   Temp (24hrs), Av 7 °F (37 1 °C), Min:98 2 °F (36 8 °C), Max:99 5 °F (37 5 °C)    Temp:  [98 2 °F (36 8 °C)-99 5 °F (37 5 °C)] 98 2 °F (36 8 °C)  HR:  [54-63] 63  Resp:  [18-20] 18  BP: (119-133)/(68-77) 119/77  SpO2:  [89 %-95 %] 89 %  Body mass index is 29 79 kg/m²  Input and Output Summary (last 24 hours): Intake/Output Summary (Last 24 hours) at 2021 1301  Last data filed at 2021 0300  Gross per 24 hour   Intake 360 ml   Output --   Net 360 ml       Physical Exam:     Physical Exam  Vitals signs and nursing note reviewed  Constitutional:       Appearance: Normal appearance  HENT:      Head: Normocephalic and atraumatic  Nose: Nose normal  No congestion  Mouth/Throat:      Mouth: Mucous membranes are dry  Pharynx: Oropharynx is clear  Eyes:      General:         Right eye: No discharge  Left eye: No discharge  Neck:      Musculoskeletal: Normal range of motion and neck supple     Cardiovascular:      Rate and Rhythm: Normal rate and regular rhythm  Pulmonary:      Effort: Pulmonary effort is normal  No respiratory distress  Comments: On 1 5 liters nasal canula   Abdominal:      General: Abdomen is flat  Palpations: Abdomen is soft  Musculoskeletal:      Right lower leg: No edema  Left lower leg: No edema  Skin:     General: Skin is warm and dry  Neurological:      General: No focal deficit present  Mental Status: She is alert  Mental status is at baseline  Psychiatric:         Mood and Affect: Mood normal          Behavior: Behavior normal            Additional Data:     Labs:    Results from last 7 days   Lab Units 04/09/21  0534  04/06/21  0601   WBC Thousand/uL 16 35*   < > 13 34*   HEMOGLOBIN g/dL 14 1   < > 14 9   HEMATOCRIT % 42 4   < > 46 0   PLATELETS Thousands/uL 363   < > 434*   BANDS PCT %  --   --  1   LYMPHO PCT %  --   --  31   MONO PCT %  --   --  4   EOS PCT %  --   --  0    < > = values in this interval not displayed  Results from last 7 days   Lab Units 04/09/21  0438   SODIUM mmol/L 139   POTASSIUM mmol/L 3 9   CHLORIDE mmol/L 105   CO2 mmol/L 26   BUN mg/dL 18   CREATININE mg/dL 0 63   ANION GAP mmol/L 8   CALCIUM mg/dL 8 5   ALBUMIN g/dL 2 0*   TOTAL BILIRUBIN mg/dL 0 31   ALK PHOS U/L 55   ALT U/L 21   AST U/L 13   GLUCOSE RANDOM mg/dL 143*         Results from last 7 days   Lab Units 04/09/21  1115 04/09/21  0722 04/08/21  2047 04/08/21  1546 04/08/21  1100 04/08/21  0733 04/07/21  2035 04/07/21  1539 04/07/21  1124 04/07/21  0729 04/06/21  2047 04/06/21  1536   POC GLUCOSE mg/dl 351* 196* 298* 362* 351* 117 310* 372* 458* 206* 335* 362*     Results from last 7 days   Lab Units 04/05/21  1752   HEMOGLOBIN A1C % 11 6*     Results from last 7 days   Lab Units 04/04/21  0509 04/03/21  0218   LACTIC ACID mmol/L  --  1 8   PROCALCITONIN ng/ml 1 11* 2 56*           * I Have Reviewed All Lab Data Listed Above  * Additional Pertinent Lab Tests Reviewed:  Shi 66 Admission Reviewed    Imaging:    Imaging Reports Reviewed Today Include: NA  Imaging Personally Reviewed by Myself Includes:  NA    Recent Cultures (last 7 days):     Results from last 7 days   Lab Units 04/03/21  1228 04/03/21  0227 04/03/21  0218   BLOOD CULTURE   --  No Growth After 5 Days  No Growth After 5 Days  C DIFF TOXIN B BY PCR  Positive*  Positive*  --   --        Last 24 Hours Medication List:   Current Facility-Administered Medications   Medication Dose Route Frequency Provider Last Rate    acetaminophen  650 mg Oral Q6H PRN Katiana Evans PA-C      ascorbic acid  1,000 mg Oral Q12H [facility] Katiana Evans PA-C      cholecalciferol  2,000 Units Oral Daily Jackelin Ellis      dexamethasone  6 mg Intravenous Q24H Katiana Evans PA-C      dicyclomine  10 mg Oral 4x Daily PRN Katiana Evans PA-C      docusate sodium  100 mg Oral BID Katiana Evans PA-C      enoxaparin  30 mg Subcutaneous Q12H [facility] [place], [place]      furosemide  20 mg Intravenous Once Tashi Ortiz MD      guaiFENesin  600 mg Oral Q12H BRITTANEY Johnson      insulin lispro  2-12 Units Subcutaneous TID AC Alvaro Fiore MD      insulin lispro  2-12 Units Subcutaneous HS Alvaro Fiore MD      lactobacillus acidophilus-bulgaricus  1 packet Oral TID With Meals Alvaro Fiore MD      melatonin  6 mg Oral HS Yesenia Hill PA-C      metoclopramide  10 mg Intravenous Q6H PRN Katiana Evans PA-C      [START ON 4/10/2021] multivitamin-minerals  1 tablet Oral Daily Katiana Evans PA-C      ondansetron  4 mg Intravenous Q6H PRN Katiana Evans PA-C      vancomycin  125 mg Oral Q6H Sandeep Anderson MD          Today, Patient Was Seen By: LISA Rodas      ** Please Note: Dictation voice to text software may have been used in the creation of this document   **

## 2021-04-10 PROBLEM — A41.9 SEPSIS (HCC): Status: RESOLVED | Noted: 2021-04-03 | Resolved: 2021-04-10

## 2021-04-10 LAB
ALBUMIN SERPL BCP-MCNC: 2.2 G/DL (ref 3.5–5)
ALP SERPL-CCNC: 63 U/L (ref 46–116)
ALT SERPL W P-5'-P-CCNC: 22 U/L (ref 12–78)
ANION GAP SERPL CALCULATED.3IONS-SCNC: 9 MMOL/L (ref 4–13)
AST SERPL W P-5'-P-CCNC: 12 U/L (ref 5–45)
BACTERIA UR QL AUTO: ABNORMAL /HPF
BASOPHILS # BLD MANUAL: 0 THOUSAND/UL (ref 0–0.1)
BASOPHILS NFR MAR MANUAL: 0 % (ref 0–1)
BILIRUB SERPL-MCNC: 0.4 MG/DL (ref 0.2–1)
BILIRUB UR QL STRIP: NEGATIVE
BUN SERPL-MCNC: 21 MG/DL (ref 5–25)
CALCIUM ALBUM COR SERPL-MCNC: 10.1 MG/DL (ref 8.3–10.1)
CALCIUM SERPL-MCNC: 8.7 MG/DL (ref 8.3–10.1)
CAOX CRY URNS QL MICRO: ABNORMAL /HPF
CHLORIDE SERPL-SCNC: 106 MMOL/L (ref 100–108)
CLARITY UR: CLEAR
CO2 SERPL-SCNC: 29 MMOL/L (ref 21–32)
COLOR UR: YELLOW
CREAT SERPL-MCNC: 0.68 MG/DL (ref 0.6–1.3)
CRP SERPL QL: 10.8 MG/L
D DIMER PPP FEU-MCNC: 0.27 UG/ML FEU
EOSINOPHIL # BLD MANUAL: 0 THOUSAND/UL (ref 0–0.4)
EOSINOPHIL NFR BLD MANUAL: 0 % (ref 0–6)
ERYTHROCYTE [DISTWIDTH] IN BLOOD BY AUTOMATED COUNT: 13.1 % (ref 11.6–15.1)
FERRITIN SERPL-MCNC: 1002 NG/ML (ref 8–388)
GFR SERPL CREATININE-BSD FRML MDRD: 95 ML/MIN/1.73SQ M
GLUCOSE SERPL-MCNC: 136 MG/DL (ref 65–140)
GLUCOSE SERPL-MCNC: 257 MG/DL (ref 65–140)
GLUCOSE SERPL-MCNC: 277 MG/DL (ref 65–140)
GLUCOSE SERPL-MCNC: 341 MG/DL (ref 65–140)
GLUCOSE SERPL-MCNC: 374 MG/DL (ref 65–140)
GLUCOSE UR STRIP-MCNC: ABNORMAL MG/DL
HCT VFR BLD AUTO: 44.3 % (ref 34.8–46.1)
HGB BLD-MCNC: 14.8 G/DL (ref 11.5–15.4)
HGB UR QL STRIP.AUTO: NEGATIVE
KETONES UR STRIP-MCNC: NEGATIVE MG/DL
LEUKOCYTE ESTERASE UR QL STRIP: ABNORMAL
LYMPHOCYTES # BLD AUTO: 32 % (ref 14–44)
LYMPHOCYTES # BLD AUTO: 5.04 THOUSAND/UL (ref 0.6–4.47)
MAGNESIUM SERPL-MCNC: 2 MG/DL (ref 1.6–2.6)
MCH RBC QN AUTO: 30.4 PG (ref 26.8–34.3)
MCHC RBC AUTO-ENTMCNC: 33.4 G/DL (ref 31.4–37.4)
MCV RBC AUTO: 91 FL (ref 82–98)
METAMYELOCYTES NFR BLD MANUAL: 1 % (ref 0–1)
MONOCYTES # BLD AUTO: 0.95 THOUSAND/UL (ref 0–1.22)
MONOCYTES NFR BLD: 6 % (ref 4–12)
MYELOCYTES NFR BLD MANUAL: 2 % (ref 0–1)
NEUTROPHILS # BLD MANUAL: 8.98 THOUSAND/UL (ref 1.85–7.62)
NEUTS BAND NFR BLD MANUAL: 1 % (ref 0–8)
NEUTS SEG NFR BLD AUTO: 56 % (ref 43–75)
NITRITE UR QL STRIP: NEGATIVE
NON-SQ EPI CELLS URNS QL MICRO: ABNORMAL /HPF
NRBC BLD AUTO-RTO: 0 /100 WBCS
PH UR STRIP.AUTO: 6 [PH]
PLATELET # BLD AUTO: 385 THOUSANDS/UL (ref 149–390)
PLATELET BLD QL SMEAR: ADEQUATE
PMV BLD AUTO: 10.3 FL (ref 8.9–12.7)
POTASSIUM SERPL-SCNC: 3.8 MMOL/L (ref 3.5–5.3)
PROT SERPL-MCNC: 5.9 G/DL (ref 6.4–8.2)
PROT UR STRIP-MCNC: NEGATIVE MG/DL
RBC # BLD AUTO: 4.87 MILLION/UL (ref 3.81–5.12)
RBC #/AREA URNS AUTO: ABNORMAL /HPF
SODIUM SERPL-SCNC: 144 MMOL/L (ref 136–145)
SP GR UR STRIP.AUTO: 1.01 (ref 1–1.03)
TOTAL CELLS COUNTED SPEC: 100
UROBILINOGEN UR QL STRIP.AUTO: 0.2 E.U./DL
VARIANT LYMPHS # BLD AUTO: 2 %
WBC # BLD AUTO: 15.76 THOUSAND/UL (ref 4.31–10.16)
WBC #/AREA URNS AUTO: ABNORMAL /HPF

## 2021-04-10 PROCEDURE — 80053 COMPREHEN METABOLIC PANEL: CPT | Performed by: STUDENT IN AN ORGANIZED HEALTH CARE EDUCATION/TRAINING PROGRAM

## 2021-04-10 PROCEDURE — 81001 URINALYSIS AUTO W/SCOPE: CPT | Performed by: NURSE PRACTITIONER

## 2021-04-10 PROCEDURE — 85027 COMPLETE CBC AUTOMATED: CPT | Performed by: STUDENT IN AN ORGANIZED HEALTH CARE EDUCATION/TRAINING PROGRAM

## 2021-04-10 PROCEDURE — 82728 ASSAY OF FERRITIN: CPT | Performed by: STUDENT IN AN ORGANIZED HEALTH CARE EDUCATION/TRAINING PROGRAM

## 2021-04-10 PROCEDURE — 82948 REAGENT STRIP/BLOOD GLUCOSE: CPT

## 2021-04-10 PROCEDURE — 86140 C-REACTIVE PROTEIN: CPT | Performed by: STUDENT IN AN ORGANIZED HEALTH CARE EDUCATION/TRAINING PROGRAM

## 2021-04-10 PROCEDURE — 85379 FIBRIN DEGRADATION QUANT: CPT | Performed by: STUDENT IN AN ORGANIZED HEALTH CARE EDUCATION/TRAINING PROGRAM

## 2021-04-10 PROCEDURE — 85007 BL SMEAR W/DIFF WBC COUNT: CPT | Performed by: STUDENT IN AN ORGANIZED HEALTH CARE EDUCATION/TRAINING PROGRAM

## 2021-04-10 PROCEDURE — 83735 ASSAY OF MAGNESIUM: CPT | Performed by: STUDENT IN AN ORGANIZED HEALTH CARE EDUCATION/TRAINING PROGRAM

## 2021-04-10 PROCEDURE — 99233 SBSQ HOSP IP/OBS HIGH 50: CPT | Performed by: STUDENT IN AN ORGANIZED HEALTH CARE EDUCATION/TRAINING PROGRAM

## 2021-04-10 RX ORDER — CLOTRIMAZOLE 1 %
1 CREAM WITH APPLICATOR VAGINAL
Status: DISCONTINUED | OUTPATIENT
Start: 2021-04-10 | End: 2021-04-12 | Stop reason: HOSPADM

## 2021-04-10 RX ADMIN — Medication 125 MG: at 05:40

## 2021-04-10 RX ADMIN — INSULIN LISPRO 6 UNITS: 100 INJECTION, SOLUTION INTRAVENOUS; SUBCUTANEOUS at 08:50

## 2021-04-10 RX ADMIN — INSULIN LISPRO 10 UNITS: 100 INJECTION, SOLUTION INTRAVENOUS; SUBCUTANEOUS at 17:00

## 2021-04-10 RX ADMIN — Medication 1 TABLET: at 09:28

## 2021-04-10 RX ADMIN — GUAIFENESIN 600 MG: 600 TABLET ORAL at 09:29

## 2021-04-10 RX ADMIN — INSULIN LISPRO 8 UNITS: 100 INJECTION, SOLUTION INTRAVENOUS; SUBCUTANEOUS at 12:37

## 2021-04-10 RX ADMIN — Medication 2000 UNITS: at 09:28

## 2021-04-10 RX ADMIN — Medication 125 MG: at 18:27

## 2021-04-10 RX ADMIN — Medication 125 MG: at 00:26

## 2021-04-10 RX ADMIN — Medication 125 MG: at 12:36

## 2021-04-10 RX ADMIN — INSULIN LISPRO 6 UNITS: 100 INJECTION, SOLUTION INTRAVENOUS; SUBCUTANEOUS at 22:57

## 2021-04-10 RX ADMIN — MELATONIN TAB 3 MG 6 MG: 3 TAB at 22:56

## 2021-04-10 RX ADMIN — GUAIFENESIN 600 MG: 600 TABLET ORAL at 22:55

## 2021-04-10 RX ADMIN — ENOXAPARIN SODIUM 30 MG: 30 INJECTION SUBCUTANEOUS at 22:56

## 2021-04-10 RX ADMIN — DEXAMETHASONE SODIUM PHOSPHATE 6 MG: 4 INJECTION, SOLUTION INTRA-ARTICULAR; INTRALESIONAL; INTRAMUSCULAR; INTRAVENOUS; SOFT TISSUE at 05:41

## 2021-04-10 RX ADMIN — Medication 125 MG: at 23:00

## 2021-04-10 RX ADMIN — ENOXAPARIN SODIUM 30 MG: 30 INJECTION SUBCUTANEOUS at 09:29

## 2021-04-10 NOTE — CASE MANAGEMENT
4/10 DC 48H  DX: Covid pneumonia hypoxia PT'S HOME O2 EVAL SHOWED SAO2-91% ON 5LPM  Needs IP treatment for hypoxia  Will need home oxygen eval prior to dc   also IP at [facility] for covid,   PLOF: independent DCP: patient is declining  HHC, NO PT/OT ordered  Transportation: dgt will transport home

## 2021-04-10 NOTE — ASSESSMENT & PLAN NOTE
Patient meeting sepsis criteria with tachypnea, tachycardia  In the setting of COVID-19  · Moderate COVID-19 pathway    · Completed course of IV antibiotics   · Blood culture no growth in 72+ hours  · Resolved

## 2021-04-10 NOTE — ASSESSMENT & PLAN NOTE
· Likely from steroid    A1c 11 6%  · Glucose levels were poorly controlled but now improving, continue current regimen     Results from last 7 days   Lab Units 04/10/21  0544 04/09/21  0438 04/08/21  0504 04/07/21  0520 04/06/21  0601 04/05/21  1752 04/05/21  0552   GLUCOSE RANDOM mg/dL 136 143* 89 124 83 508* 370*

## 2021-04-10 NOTE — ASSESSMENT & PLAN NOTE
· Started on oral vancomycin  · No previous history    Will continue for 10 days total  · Completion of vancomycin on 4/11

## 2021-04-11 LAB
ALBUMIN SERPL BCP-MCNC: 2.4 G/DL (ref 3.5–5)
ALP SERPL-CCNC: 65 U/L (ref 46–116)
ALT SERPL W P-5'-P-CCNC: 26 U/L (ref 12–78)
ANION GAP SERPL CALCULATED.3IONS-SCNC: 7 MMOL/L (ref 4–13)
AST SERPL W P-5'-P-CCNC: 14 U/L (ref 5–45)
BASOPHILS # BLD MANUAL: 0 THOUSAND/UL (ref 0–0.1)
BASOPHILS NFR MAR MANUAL: 0 % (ref 0–1)
BILIRUB SERPL-MCNC: 0.56 MG/DL (ref 0.2–1)
BUN SERPL-MCNC: 20 MG/DL (ref 5–25)
CALCIUM ALBUM COR SERPL-MCNC: 10.3 MG/DL (ref 8.3–10.1)
CALCIUM SERPL-MCNC: 9 MG/DL (ref 8.3–10.1)
CHLORIDE SERPL-SCNC: 105 MMOL/L (ref 100–108)
CO2 SERPL-SCNC: 29 MMOL/L (ref 21–32)
CREAT SERPL-MCNC: 0.68 MG/DL (ref 0.6–1.3)
CRP SERPL QL: 7.5 MG/L
D DIMER PPP FEU-MCNC: 0.38 UG/ML FEU
EOSINOPHIL # BLD MANUAL: 0 THOUSAND/UL (ref 0–0.4)
EOSINOPHIL NFR BLD MANUAL: 0 % (ref 0–6)
ERYTHROCYTE [DISTWIDTH] IN BLOOD BY AUTOMATED COUNT: 13 % (ref 11.6–15.1)
FERRITIN SERPL-MCNC: 964 NG/ML (ref 8–388)
GFR SERPL CREATININE-BSD FRML MDRD: 95 ML/MIN/1.73SQ M
GIANT PLATELETS BLD QL SMEAR: PRESENT
GLUCOSE SERPL-MCNC: 140 MG/DL (ref 65–140)
GLUCOSE SERPL-MCNC: 238 MG/DL (ref 65–140)
GLUCOSE SERPL-MCNC: 278 MG/DL (ref 65–140)
GLUCOSE SERPL-MCNC: 356 MG/DL (ref 65–140)
GLUCOSE SERPL-MCNC: 405 MG/DL (ref 65–140)
HCT VFR BLD AUTO: 46.6 % (ref 34.8–46.1)
HGB BLD-MCNC: 15.5 G/DL (ref 11.5–15.4)
LG PLATELETS BLD QL SMEAR: PRESENT
LYMPHOCYTES # BLD AUTO: 17 % (ref 14–44)
LYMPHOCYTES # BLD AUTO: 2.52 THOUSAND/UL (ref 0.6–4.47)
MAGNESIUM SERPL-MCNC: 2 MG/DL (ref 1.6–2.6)
MCH RBC QN AUTO: 30.2 PG (ref 26.8–34.3)
MCHC RBC AUTO-ENTMCNC: 33.3 G/DL (ref 31.4–37.4)
MCV RBC AUTO: 91 FL (ref 82–98)
MONOCYTES # BLD AUTO: 0.74 THOUSAND/UL (ref 0–1.22)
MONOCYTES NFR BLD: 5 % (ref 4–12)
NEUTROPHILS # BLD MANUAL: 11.58 THOUSAND/UL (ref 1.85–7.62)
NEUTS BAND NFR BLD MANUAL: 2 % (ref 0–8)
NEUTS SEG NFR BLD AUTO: 76 % (ref 43–75)
NRBC BLD AUTO-RTO: 0 /100 WBCS
PLATELET # BLD AUTO: 393 THOUSANDS/UL (ref 149–390)
PLATELET BLD QL SMEAR: ADEQUATE
PMV BLD AUTO: 10.4 FL (ref 8.9–12.7)
POTASSIUM SERPL-SCNC: 4.1 MMOL/L (ref 3.5–5.3)
PROT SERPL-MCNC: 6.1 G/DL (ref 6.4–8.2)
RBC # BLD AUTO: 5.13 MILLION/UL (ref 3.81–5.12)
SODIUM SERPL-SCNC: 141 MMOL/L (ref 136–145)
TOTAL CELLS COUNTED SPEC: 100
WBC # BLD AUTO: 14.85 THOUSAND/UL (ref 4.31–10.16)

## 2021-04-11 PROCEDURE — 82948 REAGENT STRIP/BLOOD GLUCOSE: CPT

## 2021-04-11 PROCEDURE — 85007 BL SMEAR W/DIFF WBC COUNT: CPT | Performed by: STUDENT IN AN ORGANIZED HEALTH CARE EDUCATION/TRAINING PROGRAM

## 2021-04-11 PROCEDURE — 82728 ASSAY OF FERRITIN: CPT | Performed by: STUDENT IN AN ORGANIZED HEALTH CARE EDUCATION/TRAINING PROGRAM

## 2021-04-11 PROCEDURE — 83735 ASSAY OF MAGNESIUM: CPT | Performed by: STUDENT IN AN ORGANIZED HEALTH CARE EDUCATION/TRAINING PROGRAM

## 2021-04-11 PROCEDURE — 86140 C-REACTIVE PROTEIN: CPT | Performed by: STUDENT IN AN ORGANIZED HEALTH CARE EDUCATION/TRAINING PROGRAM

## 2021-04-11 PROCEDURE — 80053 COMPREHEN METABOLIC PANEL: CPT | Performed by: STUDENT IN AN ORGANIZED HEALTH CARE EDUCATION/TRAINING PROGRAM

## 2021-04-11 PROCEDURE — 85027 COMPLETE CBC AUTOMATED: CPT | Performed by: STUDENT IN AN ORGANIZED HEALTH CARE EDUCATION/TRAINING PROGRAM

## 2021-04-11 PROCEDURE — 99233 SBSQ HOSP IP/OBS HIGH 50: CPT | Performed by: STUDENT IN AN ORGANIZED HEALTH CARE EDUCATION/TRAINING PROGRAM

## 2021-04-11 PROCEDURE — 85379 FIBRIN DEGRADATION QUANT: CPT | Performed by: STUDENT IN AN ORGANIZED HEALTH CARE EDUCATION/TRAINING PROGRAM

## 2021-04-11 RX ADMIN — Medication 1 PACKET: at 12:51

## 2021-04-11 RX ADMIN — Medication 125 MG: at 05:00

## 2021-04-11 RX ADMIN — Medication 2000 UNITS: at 09:56

## 2021-04-11 RX ADMIN — INSULIN LISPRO 10 UNITS: 100 INJECTION, SOLUTION INTRAVENOUS; SUBCUTANEOUS at 17:30

## 2021-04-11 RX ADMIN — DEXAMETHASONE SODIUM PHOSPHATE 6 MG: 4 INJECTION, SOLUTION INTRA-ARTICULAR; INTRALESIONAL; INTRAMUSCULAR; INTRAVENOUS; SOFT TISSUE at 04:59

## 2021-04-11 RX ADMIN — ENOXAPARIN SODIUM 30 MG: 30 INJECTION SUBCUTANEOUS at 09:56

## 2021-04-11 RX ADMIN — Medication 125 MG: at 23:51

## 2021-04-11 RX ADMIN — INSULIN LISPRO 4 UNITS: 100 INJECTION, SOLUTION INTRAVENOUS; SUBCUTANEOUS at 22:22

## 2021-04-11 RX ADMIN — INSULIN LISPRO 12 UNITS: 100 INJECTION, SOLUTION INTRAVENOUS; SUBCUTANEOUS at 12:52

## 2021-04-11 RX ADMIN — Medication 125 MG: at 17:30

## 2021-04-11 RX ADMIN — Medication 1 PACKET: at 10:01

## 2021-04-11 RX ADMIN — Medication 1 PACKET: at 17:30

## 2021-04-11 RX ADMIN — Medication 1 TABLET: at 09:56

## 2021-04-11 RX ADMIN — Medication 125 MG: at 12:51

## 2021-04-11 RX ADMIN — INSULIN LISPRO 4 UNITS: 100 INJECTION, SOLUTION INTRAVENOUS; SUBCUTANEOUS at 09:00

## 2021-04-11 RX ADMIN — GUAIFENESIN 600 MG: 600 TABLET ORAL at 09:56

## 2021-04-11 RX ADMIN — ENOXAPARIN SODIUM 30 MG: 30 INJECTION SUBCUTANEOUS at 22:22

## 2021-04-11 RX ADMIN — GUAIFENESIN 600 MG: 600 TABLET ORAL at 22:22

## 2021-04-11 RX ADMIN — MELATONIN TAB 3 MG 6 MG: 3 TAB at 22:22

## 2021-04-11 NOTE — ASSESSMENT & PLAN NOTE
· Likely from steroid    A1c 11 6%  · Glucose levels were poorly controlled but now improving, continue current regimen     Results from last 7 days   Lab Units 04/11/21  0638 04/10/21  0544 04/09/21  0438 04/08/21  0504 04/07/21  0520 04/06/21  0601 04/05/21  1752   GLUCOSE RANDOM mg/dL 140 136 143* 89 124 83 508*

## 2021-04-11 NOTE — PLAN OF CARE
Problem: Prexisting or High Potential for Compromised Skin Integrity  Goal: Skin integrity is maintained or improved  Description: INTERVENTIONS:  - Identify patients at risk for skin breakdown  - Assess and monitor skin integrity  - Assess and monitor nutrition and hydration status  - Monitor labs   - Assess for incontinence   - Turn and reposition patient  - Assist with mobility/ambulation  - Relieve pressure over bony prominences  - Avoid friction and shearing  - Provide appropriate hygiene as needed including keeping skin clean and dry  - Evaluate need for skin moisturizer/barrier cream  - Collaborate with interdisciplinary team   - Patient/family teaching  - Consider wound care consult   Outcome: Progressing     Problem: Nutrition/Hydration-ADULT  Goal: Nutrient/Hydration intake appropriate for improving, restoring or maintaining nutritional needs  Description: Monitor and assess patient's nutrition/hydration status for malnutrition  Collaborate with interdisciplinary team and initiate plan and interventions as ordered  Monitor patient's weight and dietary intake as ordered or per policy  Utilize nutrition screening tool and intervene as necessary  Determine patient's food preferences and provide high-protein, high-caloric foods as appropriate       INTERVENTIONS:  - Monitor oral intake, urinary output, labs, and treatment plans  - Assess nutrition and hydration status and recommend course of action  - Evaluate amount of meals eaten  - Assist patient with eating if necessary   - Allow adequate time for meals  - Recommend/ encourage appropriate diets, oral nutritional supplements, and vitamin/mineral supplements  - Order, calculate, and assess calorie counts as needed  - Recommend, monitor, and adjust tube feedings based on assessed needs  - Assess need for intravenous fluids  - Provide nutrition/hydration education as appropriate  - Include patient/family/caregiver in decisions related to nutrition  Outcome: Progressing     Problem: Potential for Falls  Goal: Patient will remain free of falls  Description: INTERVENTIONS:  - Assess patient frequently for physical needs  -  Identify cognitive and physical deficits and behaviors that affect risk of falls  -  [place] fall precautions as indicated by assessment   - Educate patient/family on patient safety including physical limitations  - Instruct patient to call for assistance with activity based on assessment  - Modify environment to reduce risk of injury  - Consider OT/PT consult to assist with strengthening/mobility  Outcome: Progressing     Problem: DISCHARGE PLANNING  Goal: Discharge to home or other facility with appropriate resources  Description: INTERVENTIONS:  - Identify barriers to discharge w/patient and caregiver  - Arrange for needed discharge resources and transportation as appropriate  - Identify discharge learning needs (meds, wound care, etc )  - Arrange for interpretive services to assist at discharge as needed  - Refer to Case Management Department for coordinating discharge planning if the patient needs post-hospital services based on physician/advanced practitioner order or complex needs related to functional status, cognitive ability, or social support system  Outcome: Progressing     Problem: Knowledge Deficit  Goal: Patient/family/caregiver demonstrates understanding of disease process, treatment plan, medications, and discharge instructions  Description: Complete learning assessment and assess knowledge base    Interventions:  - Provide teaching at level of understanding  - Provide teaching via preferred learning methods  Outcome: Progressing     Problem: RESPIRATORY - ADULT  Goal: Achieves optimal ventilation and oxygenation  Description: INTERVENTIONS:  - Assess for changes in respiratory status  - Assess for changes in mentation and behavior  - Position to facilitate oxygenation and minimize respiratory effort  - Oxygen administered by appropriate delivery if ordered  - Initiate smoking cessation education as indicated  - Encourage broncho-pulmonary hygiene including cough, deep breathe, Incentive Spirometry  - Assess the need for suctioning and aspirate as needed  - Assess and instruct to report SOB or any respiratory difficulty  - Respiratory Therapy support as indicated  Outcome: Progressing     Problem: GASTROINTESTINAL - ADULT  Goal: Minimal or absence of nausea and/or vomiting  Description: INTERVENTIONS:  - Administer IV fluids if ordered to ensure adequate hydration  - Maintain NPO status until nausea and vomiting are resolved  - Nasogastric tube if ordered  - Administer ordered antiemetic medications as needed  - Provide nonpharmacologic comfort measures as appropriate  - Advance diet as tolerated, if ordered  - Consider nutrition services referral to assist patient with adequate nutrition and appropriate food choices  Outcome: Progressing  Goal: Maintains or returns to baseline bowel function  Description: INTERVENTIONS:  - Assess bowel function  - Encourage oral fluids to ensure adequate hydration  - Administer IV fluids if ordered to ensure adequate hydration  - Administer ordered medications as needed  - Encourage mobilization and activity  - Consider nutritional services referral to assist patient with adequate nutrition and appropriate food choices  Outcome: Progressing

## 2021-04-11 NOTE — PROGRESS NOTES
[facility]  Progress Note Hang Ok 1960, 63 y o  female MRN: [id number]  Unit/Bed#: -01 Encounter: [id number]  Primary Care Provider: No primary care provider on file  Date and time admitted to hospital: 4/3/2021  2:12 AM    Acute respiratory failure with hypoxia (HCC)  Assessment & Plan  · In the setting of COVID-19, Improving   · Currently saturating at 94% on 1 5 L via nasal cannula, wean as tolerated  · Was planning for discharge today but on home 02 evaluation, required 5 liters nasal canula  · Given above oxygen requirements, would recommend further inpatient management   · Given dose of lasix IV for any residual vascular congestion     * Pneumonia due to COVID-19 virus  Assessment & Plan   Moderate covid 19 infection   Completed 7 day course of antibiotics    Continue IV decadron   Completed course of remdesivir    Continue lovenox D dimer max <1   Monitor inflammatory markers and oxygen status   Improving      Type 2 diabetes mellitus ([facility] [address] )  Assessment & Plan  · Likely from steroid  A1c 11 6%  · Glucose levels were poorly controlled but now improving, continue current regimen     Results from last 7 days   Lab Units 04/11/21  0638 04/10/21  0544 04/09/21  0438 04/08/21  0504 04/07/21  0520 04/06/21  0601 04/05/21  1752   GLUCOSE RANDOM mg/dL 140 136 143* 89 124 83 508*         C  difficile colitis  Assessment & Plan  · Started on oral vancomycin  · No previous history    Will continue for 10 days total  · Completion of vancomycin on 4/11     Abdominal pain  Assessment & Plan  Patient presents with generalized abdominal pain, nausea, vomiting, and diarrhea  · In the setting of COVID-19  · LFTs within normal limits  · Her C diff also positive  · Abdominal pain and diarrhea resolved  · Continue PO vanomycin with plan for 10 day course      VTE Pharmacologic Prophylaxis:   Pharmacologic: Enoxaparin (Lovenox)  Mechanical VTE Prophylaxis in Place: Yes    Patient Centered Rounds: I have performed bedside rounds with nursing staff today  Discussions with Specialists or Other Care Team Provider: BRYSON    Time Spent for Care: 30 minutes  More than 50% of total time spent on counseling and coordination of care as described above  Current Length of Stay: 8 day(s)    Current Patient Status: Inpatient   Certification Statement: The patient will continue to require additional inpatient hospital stay due to Hypoxic respiratory failure    Discharge Plan: 24 hours     Code Status: Level 1 - Full Code      Subjective:   Patient feels better today, no chest pain or chest palpitations and shortness of breath has improved  Appetite is good  Diarrhea resolved  Objective:     Vitals:   Temp (24hrs), Av 2 °F (36 8 °C), Min:98 °F (36 7 °C), Max:98 6 °F (37 °C)    Temp:  [98 °F (36 7 °C)-98 6 °F (37 °C)] 98 °F (36 7 °C)  HR:  [61-63] 61  Resp:  [18] 18  BP: (102-123)/(53-81) 102/53  SpO2:  [93 %-95 %] 93 %  Body mass index is 29 79 kg/m²  Input and Output Summary (last 24 hours): Intake/Output Summary (Last 24 hours) at 2021 1501  Last data filed at 2021 0035  Gross per 24 hour   Intake 240 ml   Output --   Net 240 ml       Physical Exam:     Physical Exam  Vitals signs and nursing note reviewed  Constitutional:       Appearance: Normal appearance  HENT:      Head: Normocephalic and atraumatic  Nose: Nose normal  No congestion  Mouth/Throat:      Mouth: Mucous membranes are dry  Pharynx: Oropharynx is clear  Eyes:      General:         Right eye: No discharge  Left eye: No discharge  Neck:      Musculoskeletal: Normal range of motion and neck supple  Cardiovascular:      Rate and Rhythm: Normal rate and regular rhythm  Pulmonary:      Effort: Pulmonary effort is normal  No respiratory distress  Comments: On 1 5 liters nasal canula  Abdominal:      General: Abdomen is flat  Palpations: Abdomen is soft  Musculoskeletal:      Right lower leg: No edema  Left lower leg: No edema  Skin:     General: Skin is warm and dry  Neurological:      General: No focal deficit present  Mental Status: She is alert  Mental status is at baseline  Psychiatric:         Mood and Affect: Mood normal          Behavior: Behavior normal            Additional Data:     Labs:    Results from last 7 days   Lab Units 04/11/21  0638   WBC Thousand/uL 14 85*   HEMOGLOBIN g/dL 15 5*   HEMATOCRIT % 46 6*   PLATELETS Thousands/uL 393*   BANDS PCT % 2   LYMPHO PCT % 17   MONO PCT % 5   EOS PCT % 0     Results from last 7 days   Lab Units 04/11/21  0638   SODIUM mmol/L 141   POTASSIUM mmol/L 4 1   CHLORIDE mmol/L 105   CO2 mmol/L 29   BUN mg/dL 20   CREATININE mg/dL 0 68   ANION GAP mmol/L 7   CALCIUM mg/dL 9 0   ALBUMIN g/dL 2 4*   TOTAL BILIRUBIN mg/dL 0 56   ALK PHOS U/L 65   ALT U/L 26   AST U/L 14   GLUCOSE RANDOM mg/dL 140         Results from last 7 days   Lab Units 04/11/21  1138 04/11/21  0803 04/10/21  2115 04/10/21  1603 04/10/21  1126 04/10/21  0850 04/09/21  2041 04/09/21  1550 04/09/21  1115 04/09/21  0722 04/08/21  2047 04/08/21  1546   POC GLUCOSE mg/dl 405* 238* 257* 374* 341* 277* 339* 370* 351* 196* 298* 362*     Results from last 7 days   Lab Units 04/05/21  1752   HEMOGLOBIN A1C % 11 6*               * I Have Reviewed All Lab Data Listed Above  * Additional Pertinent Lab Tests Reviewed:  Shi 66 Admission Reviewed    Imaging:    Imaging Reports Reviewed Today Include: NA  Imaging Personally Reviewed by Myself Includes:  NA    Recent Cultures (last 7 days):           Last 24 Hours Medication List:   Current Facility-Administered Medications   Medication Dose Route Frequency Provider Last Rate    acetaminophen  650 mg Oral Q6H PRN Holly Albright PA-C      cholecalciferol  2,000 Units Oral Daily Jackelin Restrepo      clotrimazole  1 applicator Vaginal HS CHAPIN Herrera  dexamethasone  6 mg Intravenous Q24H Shira Muñoz PA-C      dicyclomine  10 mg Oral 4x Daily PRN Shira Muñoz PA-C      docusate sodium  100 mg Oral BID Shira Muñoz PA-C      enoxaparin  30 mg Subcutaneous Q12H [address] [place], [place]      guaiFENesin  600 mg Oral Q12H [address] Shirajulissa Muñoz PA-C      insulin lispro  2-12 Units Subcutaneous TID AC Eryn Davila MD      insulin lispro  2-12 Units Subcutaneous HS Eryn Davila MD      lactobacillus acidophilus-bulgaricus  1 packet Oral TID With Meals Eryn Davila MD      melatonin  6 mg Oral HS Yesenia Hill PA-C      metoclopramide  10 mg Intravenous Q6H PRN Shira Muñoz PA-C      multivitamin-minerals  1 tablet Oral Daily [place], [place]      ondansetron  4 mg Intravenous Q6H PRN Shira Muñoz PA-C      vancomycin  125 mg Oral Q6H Adolfo Escalera MD          Today, Patient Was Seen By: LISA Galan      ** Please Note: Dictation voice to text software may have been used in the creation of this document   **

## 2021-04-12 VITALS
DIASTOLIC BLOOD PRESSURE: 60 MMHG | TEMPERATURE: 98 F | SYSTOLIC BLOOD PRESSURE: 125 MMHG | OXYGEN SATURATION: 94 % | HEART RATE: 64 BPM | RESPIRATION RATE: 17 BRPM | HEIGHT: 65 IN | BODY MASS INDEX: 29.82 KG/M2 | WEIGHT: 179 LBS

## 2021-04-12 LAB
ALBUMIN SERPL BCP-MCNC: 2.1 G/DL (ref 3.5–5)
ALP SERPL-CCNC: 57 U/L (ref 46–116)
ALT SERPL W P-5'-P-CCNC: 19 U/L (ref 12–78)
ANION GAP SERPL CALCULATED.3IONS-SCNC: 5 MMOL/L (ref 4–13)
AST SERPL W P-5'-P-CCNC: 8 U/L (ref 5–45)
BILIRUB SERPL-MCNC: 0.33 MG/DL (ref 0.2–1)
BUN SERPL-MCNC: 18 MG/DL (ref 5–25)
CALCIUM ALBUM COR SERPL-MCNC: 10 MG/DL (ref 8.3–10.1)
CALCIUM SERPL-MCNC: 8.5 MG/DL (ref 8.3–10.1)
CHLORIDE SERPL-SCNC: 107 MMOL/L (ref 100–108)
CO2 SERPL-SCNC: 28 MMOL/L (ref 21–32)
CREAT SERPL-MCNC: 0.66 MG/DL (ref 0.6–1.3)
CRP SERPL QL: 5.3 MG/L
D DIMER PPP FEU-MCNC: <0.27 UG/ML FEU
ERYTHROCYTE [DISTWIDTH] IN BLOOD BY AUTOMATED COUNT: 13.1 % (ref 11.6–15.1)
FERRITIN SERPL-MCNC: 894 NG/ML (ref 8–388)
GFR SERPL CREATININE-BSD FRML MDRD: 96 ML/MIN/1.73SQ M
GLUCOSE SERPL-MCNC: 212 MG/DL (ref 65–140)
GLUCOSE SERPL-MCNC: 233 MG/DL (ref 65–140)
GLUCOSE SERPL-MCNC: 327 MG/DL (ref 65–140)
GLUCOSE SERPL-MCNC: 370 MG/DL (ref 65–140)
HCT VFR BLD AUTO: 41.6 % (ref 34.8–46.1)
HGB BLD-MCNC: 13.6 G/DL (ref 11.5–15.4)
MAGNESIUM SERPL-MCNC: 2.1 MG/DL (ref 1.6–2.6)
MCH RBC QN AUTO: 29.9 PG (ref 26.8–34.3)
MCHC RBC AUTO-ENTMCNC: 32.7 G/DL (ref 31.4–37.4)
MCV RBC AUTO: 91 FL (ref 82–98)
NRBC BLD AUTO-RTO: 0 /100 WBCS
PLATELET # BLD AUTO: 317 THOUSANDS/UL (ref 149–390)
PMV BLD AUTO: 10.4 FL (ref 8.9–12.7)
POTASSIUM SERPL-SCNC: 3.9 MMOL/L (ref 3.5–5.3)
PROT SERPL-MCNC: 5.4 G/DL (ref 6.4–8.2)
RBC # BLD AUTO: 4.55 MILLION/UL (ref 3.81–5.12)
SODIUM SERPL-SCNC: 140 MMOL/L (ref 136–145)
WBC # BLD AUTO: 14.1 THOUSAND/UL (ref 4.31–10.16)

## 2021-04-12 PROCEDURE — 85027 COMPLETE CBC AUTOMATED: CPT | Performed by: STUDENT IN AN ORGANIZED HEALTH CARE EDUCATION/TRAINING PROGRAM

## 2021-04-12 PROCEDURE — 94761 N-INVAS EAR/PLS OXIMETRY MLT: CPT

## 2021-04-12 PROCEDURE — 83735 ASSAY OF MAGNESIUM: CPT | Performed by: STUDENT IN AN ORGANIZED HEALTH CARE EDUCATION/TRAINING PROGRAM

## 2021-04-12 PROCEDURE — 85379 FIBRIN DEGRADATION QUANT: CPT | Performed by: STUDENT IN AN ORGANIZED HEALTH CARE EDUCATION/TRAINING PROGRAM

## 2021-04-12 PROCEDURE — 99239 HOSP IP/OBS DSCHRG MGMT >30: CPT | Performed by: STUDENT IN AN ORGANIZED HEALTH CARE EDUCATION/TRAINING PROGRAM

## 2021-04-12 PROCEDURE — 82728 ASSAY OF FERRITIN: CPT | Performed by: STUDENT IN AN ORGANIZED HEALTH CARE EDUCATION/TRAINING PROGRAM

## 2021-04-12 PROCEDURE — 86140 C-REACTIVE PROTEIN: CPT | Performed by: STUDENT IN AN ORGANIZED HEALTH CARE EDUCATION/TRAINING PROGRAM

## 2021-04-12 PROCEDURE — 82948 REAGENT STRIP/BLOOD GLUCOSE: CPT

## 2021-04-12 PROCEDURE — 80053 COMPREHEN METABOLIC PANEL: CPT | Performed by: STUDENT IN AN ORGANIZED HEALTH CARE EDUCATION/TRAINING PROGRAM

## 2021-04-12 RX ORDER — DICYCLOMINE HYDROCHLORIDE 10 MG/1
10 CAPSULE ORAL 4 TIMES DAILY PRN
Qty: 30 CAPSULE | Refills: 0 | Status: SHIPPED | OUTPATIENT
Start: 2021-04-12 | End: 2021-05-12

## 2021-04-12 RX ORDER — GUAIFENESIN 600 MG
600 TABLET, EXTENDED RELEASE 12 HR ORAL EVERY 12 HOURS SCHEDULED
Qty: 60 TABLET | Refills: 0 | Status: SHIPPED | OUTPATIENT
Start: 2021-04-12 | End: 2021-05-12

## 2021-04-12 RX ORDER — VANCOMYCIN HYDROCHLORIDE 125 MG/1
125 CAPSULE ORAL 4 TIMES DAILY
Qty: 4 CAPSULE | Refills: 0 | Status: SHIPPED | OUTPATIENT
Start: 2021-04-12 | End: 2021-04-13

## 2021-04-12 RX ADMIN — Medication 1 PACKET: at 08:53

## 2021-04-12 RX ADMIN — Medication 125 MG: at 17:34

## 2021-04-12 RX ADMIN — Medication 1 TABLET: at 08:54

## 2021-04-12 RX ADMIN — Medication 125 MG: at 11:47

## 2021-04-12 RX ADMIN — DEXAMETHASONE SODIUM PHOSPHATE 6 MG: 4 INJECTION, SOLUTION INTRA-ARTICULAR; INTRALESIONAL; INTRAMUSCULAR; INTRAVENOUS; SOFT TISSUE at 05:13

## 2021-04-12 RX ADMIN — Medication 1 PACKET: at 17:33

## 2021-04-12 RX ADMIN — INSULIN LISPRO 8 UNITS: 100 INJECTION, SOLUTION INTRAVENOUS; SUBCUTANEOUS at 11:46

## 2021-04-12 RX ADMIN — Medication 125 MG: at 05:13

## 2021-04-12 RX ADMIN — INSULIN LISPRO 10 UNITS: 100 INJECTION, SOLUTION INTRAVENOUS; SUBCUTANEOUS at 17:34

## 2021-04-12 RX ADMIN — Medication 2000 UNITS: at 08:54

## 2021-04-12 RX ADMIN — Medication 1 PACKET: at 11:46

## 2021-04-12 RX ADMIN — INSULIN LISPRO 4 UNITS: 100 INJECTION, SOLUTION INTRAVENOUS; SUBCUTANEOUS at 08:54

## 2021-04-12 RX ADMIN — GUAIFENESIN 600 MG: 600 TABLET ORAL at 08:54

## 2021-04-12 RX ADMIN — ENOXAPARIN SODIUM 30 MG: 30 INJECTION SUBCUTANEOUS at 08:55

## 2021-04-12 NOTE — ASSESSMENT & PLAN NOTE
· Likely from steroid    A1c 11 6%  · Glucose levels were poorly controlled but now improving, continue current regimen   · Patient would prefer to be discharged home without insulin and follow up with PCP     Results from last 7 days   Lab Units 04/12/21  0513 04/11/21  2102 04/10/21  0544 04/09/21  0438 04/08/21  0504 04/07/21  0520 04/06/21  0601   GLUCOSE RANDOM mg/dL 233* 140 136 143* 89 124 83

## 2021-04-12 NOTE — CASE MANAGEMENT
Pt to be discharged home with dgt transporting  She did not qualify for home O2  Pt continues to refuse [facility] services-she feels she has adequate support in the home with her daughter's assistance

## 2021-04-12 NOTE — UTILIZATION REVIEW
Continued Stay Review    Date: 4/10/21                        Current Patient Class: inpatient  Current Level of Care: med surg  HPI:60 y o  female initially admitted on 4/3/21  Assessment/Plan:   Pneumonia 2nd COVID 23  O2 requirements currently @ 1 5ltr at rest & 5ltr with exertion, continue to wean as tolerated  Lungs with diminished breath sounds, dry NPC, persistent RAY  Remains on COVID med/tx pathway at this time  Vanco continued for c-diff, diarrhea resolved  Pertinent Labs/Diagnostic Results:   Results from last 7 days   Lab Units 04/12/21  0513 04/11/21  7207 04/10/21  0544 04/09/21  0534 04/08/21  0504  04/06/21  0601   WBC Thousand/uL 14 10* 14 85* 15 76* 16 35* 16 35*   < > 13 34*   HEMOGLOBIN g/dL 13 6 15 5* 14 8 14 1 14 2   < > 14 9   HEMATOCRIT % 41 6 46 6* 44 3 42 4 42 6   < > 46 0   PLATELETS Thousands/uL 317 393* 385 363 369   < > 434*   BANDS PCT %  --  2 1  --   --   --  1    < > = values in this interval not displayed       Results from last 7 days   Lab Units 04/12/21  0513 04/11/21  2605 04/10/21  0544 04/09/21  0438 04/08/21  0504   SODIUM mmol/L 140 141 144 139 141   POTASSIUM mmol/L 3 9 4 1 3 8 3 9 3 4*   CHLORIDE mmol/L 107 105 106 105 106   CO2 mmol/L 28 29 29 26 27   ANION GAP mmol/L 5 7 9 8 8   BUN mg/dL 18 20 21 18 21   CREATININE mg/dL 0 66 0 68 0 68 0 63 0 69   EGFR ml/min/1 73sq m 96 95 95 97 94   CALCIUM mg/dL 8 5 9 0 8 7 8 5 8 4   MAGNESIUM mg/dL 2 1 2 0 2 0 1 9 1 8     Results from last 7 days   Lab Units 04/12/21  0513 04/11/21  0638 04/10/21  0544 04/09/21  0438 04/08/21  0504   AST U/L 8 14 12 13 14   ALT U/L 19 26 22 21 25   ALK PHOS U/L 57 65 63 55 57   TOTAL PROTEIN g/dL 5 4* 6 1* 5 9* 5 3* 5 6*   ALBUMIN g/dL 2 1* 2 4* 2 2* 2 0* 2 1*   TOTAL BILIRUBIN mg/dL 0 33 0 56 0 40 0 31 0 31     Results from last 7 days   Lab Units 04/12/21  1115 04/12/21  0757 04/11/21  2111 04/11/21  1630 04/11/21  1138 04/11/21  0803 04/10/21  2115 04/10/21  1603 04/10/21  1126 04/10/21  0850 04/09/21  2041 04/09/21  1550   POC GLUCOSE mg/dl 327* 212* 278* 356* 405* 238* 257* 374* 341* 277* 339* 370*     Results from last 7 days   Lab Units 04/12/21  0513 04/11/21  3329 04/10/21  0544 04/09/21  0438 04/08/21  0504 04/07/21  0520 04/06/21  0601 04/05/21  1752   GLUCOSE RANDOM mg/dL 233* 140 136 143* 89 124 83 508*     Results from last 7 days   Lab Units 04/05/21  1752   HEMOGLOBIN A1C % 11 6*   EAG mg/dl 286     Results from last 7 days   Lab Units 04/12/21  0513 04/11/21  0638 04/10/21  0544 04/09/21  0438 04/08/21  0504   D-DIMER QUANTITATIVE ug/ml FEU <0 27 0 38 0 27 0 38 <0 27     Results from last 7 days   Lab Units 04/06/21  0601   NT-PRO BNP pg/mL 317*     Results from last 7 days   Lab Units 04/12/21  0513 04/11/21  0638 04/10/21  0544   FERRITIN ng/mL 894* 964* 1,002*     Results from last 7 days   Lab Units 04/12/21  0513 04/11/21  0638 04/10/21  0544 04/09/21  0438 04/08/21  0504   CRP mg/L 5 3* 7 5* 10 8* 14 1* 12 6*     Results from last 7 days   Lab Units 04/10/21  2255   CLARITY UA  Clear   COLOR UA  Yellow   SPEC GRAV UA  1 015   PH UA  6 0   GLUCOSE UA mg/dl >=1000 (1%)*   KETONES UA mg/dl Negative   BLOOD UA  Negative   PROTEIN UA mg/dl Negative   NITRITE UA  Negative   BILIRUBIN UA  Negative   UROBILINOGEN UA E U /dl 0 2   LEUKOCYTES UA  Elevated glucose may cause decreased leukocyte values   See urine microscopic for [facility] result/*   WBC UA /hpf 0-1   RBC UA /hpf 0-1   BACTERIA UA /hpf Occasional   EPITHELIAL CELLS WET PREP /hpf Occasional     Results from last 7 days   Lab Units 04/11/21  0638 04/10/21  0544 04/06/21  0601   TOTAL COUNTED  100 100 100       Vital Signs:   04/10/21 2300  --  --  --  --  --  95 %  26  1 5 L/min  Nasal cannula  --   04/10/21 21:15:18  98 °F (36 7 °C)  63  18  123/81  95  93 %  --  --  --  --   04/10/21 16:04:27  98 6 °F (37 °C)  61  18  119/57  78  94 %  --  --  --  --   04/10/21 1500  --  --  --  --  --  --  26  1 5 L/min  Nasal cannula  --   04/10/21 1100  --  --  --  --  --  --  26  1 5 L/min  Nasal cannula  --   04/10/21 0730  98 4 °F (36 9 °C)  72  16  107/56  73  91 %  --  --  --  Sitting   04/10/21 0700  --  --  --  --  --  --  26  1 5 L/min  Nasal cannula  --     Medications:   cholecalciferol, 2,000 Units, Oral, Daily  clotrimazole, 1 applicator, Vaginal, HS  docusate sodium, 100 mg, Oral, BID  enoxaparin, 30 mg, Subcutaneous, Q12H FRANCA  guaiFENesin, 600 mg, Oral, Q12H FARNCA  insulin lispro, 2-12 Units, Subcutaneous, TID AC  insulin lispro, 2-12 Units, Subcutaneous, HS  lactobacillus acidophilus-bulgaricus, 1 packet, Oral, TID With Meals  melatonin, 6 mg, Oral, HS  multivitamin-minerals, 1 tablet, Oral, Daily  vancomycin, 125 mg, Oral, Q6H FRANCA    PRN Meds:  acetaminophen, 650 mg, Oral, Q6H PRN  dicyclomine, 10 mg, Oral, 4x Daily PRN  metoclopramide, 10 mg, Intravenous, Q6H PRN  ondansetron, 4 mg, Intravenous, Q6H PRN    Discharge Plan: [facility]    Network Utilization Review Department  ATTENTION: Please call with any questions or concerns to [phone] and carefully listen to the prompts so that you are directed to the right person  All voicemails are confidential   Annalise Cyr all requests for admission clinical reviews, approved or denied determinations and any other requests to dedicated fax number below belonging to the campus where the patient is receiving treatment   List of dedicated fax numbers for the Facilities:  [address] DENIALS (Administrative/Medical Necessity) [phone]   [address] (Maternity/NICU/Pediatrics) [phone] [address] Deondre Alexis 9000 (  Ian Kevin "Thelma" 103) [address] [address] [facility] Kennedy Torres 1481 [address] [phone]

## 2021-04-12 NOTE — ASSESSMENT & PLAN NOTE
· Started on oral vancomycin  · No previous history    Will continue for 10 days total  · Completion of vancomycin on 4/13

## 2021-04-12 NOTE — ASSESSMENT & PLAN NOTE
Patient presents with generalized abdominal pain, nausea, vomiting, and diarrhea  · In the setting of COVID-19  · LFTs within normal limits  · Her C diff also positive  · Abdominal pain and diarrhea resolved  · Continue PO vanomycin with plan for 10 day course, plan to complete 10 day course on 4/13

## 2021-04-12 NOTE — DISCHARGE INSTR - DIET
Follow Consistent Carbohydrate Diet  Outpatient nutrition counseling available   Obtain referral from you doctor for nutrition education for diabetes  Call [phone], option #2 to set up an appointment

## 2021-04-12 NOTE — PLAN OF CARE
Problem: Prexisting or High Potential for Compromised Skin Integrity  Goal: Skin integrity is maintained or improved  Description: INTERVENTIONS:  - Identify patients at risk for skin breakdown  - Assess and monitor skin integrity  - Assess and monitor nutrition and hydration status  - Monitor labs   - Assess for incontinence   - Turn and reposition patient  - Assist with mobility/ambulation  - Relieve pressure over bony prominences  - Avoid friction and shearing  - Provide appropriate hygiene as needed including keeping skin clean and dry  - Evaluate need for skin moisturizer/barrier cream  - Collaborate with interdisciplinary team   - Patient/family teaching  - Consider wound care consult   Outcome: Progressing     Problem: Nutrition/Hydration-ADULT  Goal: Nutrient/Hydration intake appropriate for improving, restoring or maintaining nutritional needs  Description: Monitor and assess patient's nutrition/hydration status for malnutrition  Collaborate with interdisciplinary team and initiate plan and interventions as ordered  Monitor patient's weight and dietary intake as ordered or per policy  Utilize nutrition screening tool and intervene as necessary  Determine patient's food preferences and provide high-protein, high-caloric foods as appropriate       INTERVENTIONS:  - Monitor oral intake, urinary output, labs, and treatment plans  - Assess nutrition and hydration status and recommend course of action  - Evaluate amount of meals eaten  - Assist patient with eating if necessary   - Allow adequate time for meals  - Recommend/ encourage appropriate diets, oral nutritional supplements, and vitamin/mineral supplements  - Order, calculate, and assess calorie counts as needed  - Recommend, monitor, and adjust tube feedings based on assessed needs  - Assess need for intravenous fluids  - Provide nutrition/hydration education as appropriate  - Include patient/family/caregiver in decisions related to nutrition  Outcome: Progressing     Problem: Potential for Falls  Goal: Patient will remain free of falls  Description: INTERVENTIONS:  - Assess patient frequently for physical needs  -  Identify cognitive and physical deficits and behaviors that affect risk of falls  -  [place] fall precautions as indicated by assessment   - Educate patient/family on patient safety including physical limitations  - Instruct patient to call for assistance with activity based on assessment  - Modify environment to reduce risk of injury  - Consider OT/PT consult to assist with strengthening/mobility  Outcome: Progressing     Problem: DISCHARGE PLANNING  Goal: Discharge to home or other facility with appropriate resources  Description: INTERVENTIONS:  - Identify barriers to discharge w/patient and caregiver  - Arrange for needed discharge resources and transportation as appropriate  - Identify discharge learning needs (meds, wound care, etc )  - Arrange for interpretive services to assist at discharge as needed  - Refer to Case Management Department for coordinating discharge planning if the patient needs post-hospital services based on physician/advanced practitioner order or complex needs related to functional status, cognitive ability, or social support system  Outcome: Progressing     Problem: Knowledge Deficit  Goal: Patient/family/caregiver demonstrates understanding of disease process, treatment plan, medications, and discharge instructions  Description: Complete learning assessment and assess knowledge base    Interventions:  - Provide teaching at level of understanding  - Provide teaching via preferred learning methods  Outcome: Progressing     Problem: RESPIRATORY - ADULT  Goal: Achieves optimal ventilation and oxygenation  Description: INTERVENTIONS:  - Assess for changes in respiratory status  - Assess for changes in mentation and behavior  - Position to facilitate oxygenation and minimize respiratory effort  - Oxygen administered by appropriate delivery if ordered  - Initiate smoking cessation education as indicated  - Encourage broncho-pulmonary hygiene including cough, deep breathe, Incentive Spirometry  - Assess the need for suctioning and aspirate as needed  - Assess and instruct to report SOB or any respiratory difficulty  - Respiratory Therapy support as indicated  Outcome: Progressing     Problem: GASTROINTESTINAL - ADULT  Goal: Minimal or absence of nausea and/or vomiting  Description: INTERVENTIONS:  - Administer IV fluids if ordered to ensure adequate hydration  - Maintain NPO status until nausea and vomiting are resolved  - Nasogastric tube if ordered  - Administer ordered antiemetic medications as needed  - Provide nonpharmacologic comfort measures as appropriate  - Advance diet as tolerated, if ordered  - Consider nutrition services referral to assist patient with adequate nutrition and appropriate food choices  Outcome: Progressing  Goal: Maintains or returns to baseline bowel function  Description: INTERVENTIONS:  - Assess bowel function  - Encourage oral fluids to ensure adequate hydration  - Administer IV fluids if ordered to ensure adequate hydration  - Administer ordered medications as needed  - Encourage mobilization and activity  - Consider nutritional services referral to assist patient with adequate nutrition and appropriate food choices  Outcome: Progressing

## 2021-04-12 NOTE — ASSESSMENT & PLAN NOTE
 Moderate covid 19 infection   Completed 7 day course of antibiotics    Competed course of decadron   Completed course of remdesivir    Continue lovenox D dimer max <1   Monitor inflammatory markers and oxygen status   Stable for discharge

## 2021-04-12 NOTE — DISCHARGE INSTRUCTIONS

## 2021-04-12 NOTE — RESPIRATORY THERAPY NOTE
Home Oxygen Qualifying Test       Patient name: Steven Sher        :    Date of Test:  2021  Diagnosis: Covid     Home Oxygen Test:    **Medicare Guidelines require item(s) 1-5 on all ambulatory patients or 1 and 2 on non-ambulatory patients  1   Baseline SPO2 on Room Air at rest 93 %  2   SPO2 during exercise on Room Air 89-90 %  During exercise monitor SpO2  If SPO2 increases >=89% with ambulation do not add supplemental             oxygen  If <= 88% on room air add O2 via NC and titrate patient  Patient must be ambulated with O2 and titrated to > 88% with exertion  5   Exercise performed:          walking, duration 5 (min)          []  Supplemental Home Oxygen is indicated  [x]  Client does not qualify for home oxygen  Respiratory Additional Notes- Pt ambulated in room w/o any complaints  Pt does not require home O2 at this time    Donata Read

## 2021-04-12 NOTE — DISCHARGE SUMMARY
[address] [facility]  Discharge- Antonio Gather 1960, 63 y o  female MRN: [id number]  Unit/Bed#: -01 Encounter: [id number]  Primary Care Provider: No primary care provider on file  Date and time admitted to hospital: 4/3/2021  2:12 AM    Acute respiratory failure with hypoxia [facility])  Assessment & Plan  · In the setting of COVID-19, Improving   · Currently saturating at 94% on 1 5 L via nasal cannula, wean as tolerated  · Was planning for discharge today but on home 02 evaluation, required 5 liters nasal canula  · Given above oxygen requirements, would recommend further inpatient management   · Given dose of lasix IV for any residual vascular congestion   · Now does not require home oxygen for discharge  · Discharged on 4/12    * Pneumonia due to COVID-19 virus  Assessment & Plan   Moderate covid 19 infection   Completed 7 day course of antibiotics    Competed course of decadron   Completed course of remdesivir    Continue lovenox D dimer max <1   Monitor inflammatory markers and oxygen status   Stable for discharge      Type 2 diabetes mellitus ([facility] [address] )  Assessment & Plan  · Likely from steroid  A1c 11 6%  · Glucose levels were poorly controlled but now improving, continue current regimen   · Patient would prefer to be discharged home without insulin and follow up with PCP     Results from last 7 days   Lab Units 04/12/21  0513 04/11/21  6713 04/10/21  0544 04/09/21  0438 04/08/21  0504 04/07/21  0520 04/06/21  0601   GLUCOSE RANDOM mg/dL 233* 140 136 143* 89 124 83         C  difficile colitis  Assessment & Plan  · Started on oral vancomycin  · No previous history    Will continue for 10 days total  · Completion of vancomycin on 4/13    Abdominal pain  Assessment & Plan  Patient presents with generalized abdominal pain, nausea, vomiting, and diarrhea  · In the setting of COVID-19  · LFTs within normal limits  · Her C diff also positive  · Abdominal pain and diarrhea resolved  · Continue PO vanomycin with plan for 10 day course, plan to complete 10 day course on 4/13    Sepsis (HCC)-resolved as of 4/10/2021  Assessment & Plan  Patient meeting sepsis criteria with tachypnea, tachycardia  In the setting of COVID-19  · Moderate COVID-19 pathway  · Completed course of IV antibiotics   · Blood culture no growth in 72+ hours  · Resolved      Discharging Physician / Practitioner: Saira Hernadez MD  PCP: No primary care provider on file  Admission Date:   Admission Orders (From admission, onward)     Ordered        04/03/21 0321  Inpatient Admission  Once                   Discharge Date: 04/12/21    Disposition:      Other: Home    For Discharges to [facility] SNF:   · Not Applicable to this Patient - Not Applicable to this Patient    Reason for Admission: Shortness of breath     Discharge Diagnoses:     Please see assessment and plan section above for further details regarding discharge diagnoses  Resolved Problems  Date Reviewed: 4/3/2021          Resolved    Sepsis ([address] ) 4/10/2021     Resolved by  Saira Hernadez MD          Consultations During Hospital Stay:  · Pulmonology     Procedures Performed:   · imaging     Medication Adjustments and Discharge Medications:  · Summary of Medication Adjustments made as a result of this hospitalization: see med rec   · Medication Dosing Tapers - Please refer to Discharge Medication List for details on any medication dosing tapers (if applicable to patient)  · Medications being temporarily held (include recommended restart time): see med rec   · Discharge Medication List: See after visit summary for reconciled discharge medications  Wound Care Recommendations:  When applicable, please see wound care section of After Visit Summary      Diet Recommendations at Discharge:  Diet -        Diet Orders   (From admission, onward)             Start     Ordered    04/07/21 1606  Dietary nutrition supplements  Once     Question Answer Comment   Select Supplement: Banatrol Plus Banana Flakes    Mix with: Applesauce    Frequency Breakfast, Dinner        04/07/21 1605    04/06/21 0826  Diet Nicholas/CHO Controlled; Consistent Carbohydrate Diet Level 2 (5 carb servings/75 grams CHO/meal)  Diet effective now     Question Answer Comment   Diet Type Nicholas/CHO Controlled    Nicholas/CHO Controlled Consistent Carbohydrate Diet Level 2 (5 carb servings/75 grams CHO/meal)    Special Instructions Disposable Meal    RD to adjust diet per protocol? Yes        04/06/21 0825                Instructions for any Catheters / Lines Present at Discharge (including removal date, if applicable): na    Significant Findings / Test Results:   Principal Problem:    Pneumonia due to COVID-19 virus  Active Problems:    Acute respiratory failure with hypoxia (HCC)    Abdominal pain    C  difficile colitis    Type 2 diabetes mellitus ([facility] [address] )  Resolved Problems:    Sepsis ([facility] [address] )  ·   ·     Incidental Findings:   · See above      Test Results Pending at Discharge (will require follow up):   · na     Outpatient Tests Requested:  · Follow up with PCP     Complications:  See above     Hospital Course:     Steven Sher is a 63 y o  female patient who originally presented to the hospital on 4/3/2021 due to shortness of breath secondary to covid 19 complicated by c diff infection now much improved and stable for discharge  Refer to [facility] assessment/plan for further inpatient management  Condition at Discharge: good     Discharge Day Visit / Exam:     Subjective:  Patient feels well, shortness of breath resolved  No chest pain or chest palpitations  No diarrhea  Appetite is good     Vitals: Blood Pressure: 125/60 (04/12/21 1425)  Pulse: 64 (04/12/21 1425)  Temperature: 98 °F (36 7 °C) (04/12/21 1425)  Temp Source: Oral (04/11/21 1500)  Respirations: 17 (04/12/21 0759)  Height: 5' 5" (165 1 cm) (04/03/21 0216)  Weight - Scale: 81 2 kg (179 lb) (04/03/21 0216)  SpO2: 94 % (04/12/21 1425)  Exam: Physical Exam  Vitals signs and nursing note reviewed  Constitutional:       Appearance: Normal appearance  HENT:      Head: Normocephalic and atraumatic  Nose: Nose normal  No congestion  Mouth/Throat:      Mouth: Mucous membranes are dry  Pharynx: Oropharynx is clear  Eyes:      General:         Right eye: No discharge  Left eye: No discharge  Neck:      Musculoskeletal: Normal range of motion and neck supple  Cardiovascular:      Rate and Rhythm: Normal rate and regular rhythm  Pulmonary:      Effort: Pulmonary effort is normal  No respiratory distress  Abdominal:      General: Abdomen is flat  Palpations: Abdomen is soft  Musculoskeletal:      Right lower leg: No edema  Left lower leg: No edema  Skin:     General: Skin is warm and dry  Neurological:      General: No focal deficit present  Mental Status: She is alert  Mental status is at baseline  Psychiatric:         Mood and Affect: Mood normal          Behavior: Behavior normal          Discussion with Family:  is intubated at [address] of Care Discussions:  · Code Status at Discharge: Level 1 - Full Code  · Were there any Goals of Care Discussions during Hospitalization?: No  · Results of any General Goals of Care Discussions: na   · POLST Completed: No   · If POLST Completed, Summary of POLST Agreement Provided Here: na   · OK to Rehospitalize if Needed? No    Discharge instructions/Information to patient and family:   See after visit summary section titled Discharge Instructions for information provided to patient and family  Planned Readmission: none      Discharge Statement:  I spent 30+ minutes discharging the patient  This time was spent on the day of discharge  I had direct contact with the patient on the day of discharge   Greater than 50% of the total time was spent examining patient, answering all patient questions, arranging and discussing plan of care with patient as well as directly providing post-discharge instructions  Additional time then spent on discharge activities      ** Please Note: This note has been constructed using a voice recognition system **

## 2021-04-12 NOTE — ASSESSMENT & PLAN NOTE
· In the setting of COVID-19, Improving   · Currently saturating at 94% on 1 5 L via nasal cannula, wean as tolerated  · Was planning for discharge today but on home 02 evaluation, required 5 liters nasal canula  · Given above oxygen requirements, would recommend further inpatient management   · Given dose of lasix IV for any residual vascular congestion   · Now does not require home oxygen for discharge  · Discharged on 4/12

## 2021-04-13 NOTE — UTILIZATION REVIEW
Notification of Discharge  This is a Notification of Discharge from our facility [address]  Please be advised that this patient has been discharge from our facility  Below you will find the admission and discharge date and time including the patients disposition  PRESENTATION DATE: 4/3/2021  2:12 AM  OBS ADMISSION DATE:   IP ADMISSION DATE: 4/3/21 0321   DISCHARGE DATE: 4/12/2021  8:17 PM  DISPOSITION: Home/Self Care Home/Self Care   Admission Orders listed below:  Admission Orders (From admission, onward)     Ordered        04/03/21 0321  Inpatient Admission  Once                   Please contact the UR Department if additional information is required to close this patient's authorization/case  [facility] Utilization Review Department  Main: [phone] x carefully listen to the prompts  All voicemails are confidential   [email or web address]  Send all requests for admission clinical reviews, approved or denied determinations and any other requests to dedicated fax number below belonging to the campus where the patient is receiving treatment   List of dedicated fax numbers:  [address] DENIALS (Administrative/Medical Necessity) [phone]   [address] (Maternity/NICU/Pediatrics) [phone]   Dayami Iyer [phone]   Thereasa Roys [phone]   Toy Anival [phone]   [address] [phone]   [facility]  [phone]   [address] [facility]  [address] [facility] [address] [facility] [phone]

## 2022-06-02 NOTE — ASSESSMENT & PLAN NOTE
· Likely from steroid    A1c 11 6%  · Insulin sliding scale    Results from last 7 days   Lab Units 04/07/21  0520 04/06/21  0601 04/05/21  1752 04/05/21  0552 04/04/21  0509 04/03/21  0218   GLUCOSE RANDOM mg/dL 124 83 508* 370* 348* 274* Quality 431: Preventive Care And Screening: Unhealthy Alcohol Use - Screening: Patient not identified as an unhealthy alcohol user when screened for unhealthy alcohol use using a systematic screening method Quality 226: Preventive Care And Screening: Tobacco Use: Screening And Cessation Intervention: Tobacco Screening not Performed for Medical Reasons Quality 130: Documentation Of Current Medications In The Medical Record: Current Medications Documented Detail Level: Detailed

## 2022-10-12 PROBLEM — J12.82 PNEUMONIA DUE TO COVID-19 VIRUS: Status: RESOLVED | Noted: 2021-04-03 | Resolved: 2022-10-12

## 2022-10-12 PROBLEM — U07.1 PNEUMONIA DUE TO COVID-19 VIRUS: Status: RESOLVED | Noted: 2021-04-03 | Resolved: 2022-10-12

## 2023-05-28 ENCOUNTER — APPOINTMENT (EMERGENCY)
Dept: CT IMAGING | Facility: HOSPITAL | Age: 63
End: 2023-05-28

## 2023-05-28 ENCOUNTER — HOSPITAL ENCOUNTER (EMERGENCY)
Facility: HOSPITAL | Age: 63
Discharge: HOME | End: 2023-05-28
Attending: EMERGENCY MEDICINE | Admitting: EMERGENCY MEDICINE

## 2023-05-28 VITALS
SYSTOLIC BLOOD PRESSURE: 158 MMHG | DIASTOLIC BLOOD PRESSURE: 64 MMHG | TEMPERATURE: 97.6 F | OXYGEN SATURATION: 97 % | HEART RATE: 70 BPM | RESPIRATION RATE: 18 BRPM

## 2023-05-28 DIAGNOSIS — S09.90XA INJURY OF HEAD, INITIAL ENCOUNTER: Primary | ICD-10-CM

## 2023-05-28 NOTE — DISCHARGE INSTRUCTIONS
Rest  Tylenol if needed for pain  Return increasing headache vomiting confusion or any problems  Follow-up through the concussion clinic

## 2023-05-28 NOTE — ED PROVIDER NOTES
History  Chief Complaint   Patient presents with   • Fall     Pt arrived ambulatory with c/o falling off 2-3 steps off a ladder, hitting her head  -LOC, - blood thinners     HPI is a 78-year-old female reports that she stepped up on a stepstool at work and fell backwards when the stool came out from under her  Reports hitting the back of her head on the ground hearing a clunk as she hit  Patient denies any loss of consciousness but complains of pain in the left occipital area after the fall  There is no bleeding or laceration  She has any neck pain there is no focal weakness  Patient reports persistent headache after the fall and wanted to be evaluated  She is concerned about intracranial pathology  She denies any chest or abdominal injury  Denies difficulty using her extremities  She has no difficulty walking according to the patient  Past no history of diabetes sepsis tonsillectomy  Family history contributory  Social history non-smoker, employed    Prior to Admission Medications   Prescriptions Last Dose Informant Patient Reported? Taking? Ascorbic Acid (vitamin C) 1000 MG tablet  Self No No   Sig: Take 1 tablet (1,000 mg total) by mouth 2 (two) times a day for 7 days   Misc   Devices (Pulse Oximeter) MISC  Self No No   Sig: Please check your oxygen saturation every 4 hours while awake or if you experience shortness of breath   cholecalciferol (VITAMIN D3) 1,000 units tablet  Self No No   Sig: Take 2 tablets (2,000 Units total) by mouth daily for 7 days   dicyclomine (BENTYL) 10 mg capsule   No No   Sig: Take 1 capsule (10 mg total) by mouth 4 (four) times a day as needed (nausea, vomiting)   ibuprofen (MOTRIN) 600 mg tablet  Self No No   Sig: Take 1 tablet (600 mg total) by mouth every 6 (six) hours as needed for moderate pain   multivitamin-iron-minerals-folic acid (CENTRUM) chewable tablet  Self No No   Sig: Chew 1 tablet daily for 7 days   ondansetron (ZOFRAN) 4 mg tablet  Self No No   Sig: Take 1 tablet (4 mg total) by mouth every 6 (six) hours      Facility-Administered Medications: None       Past Medical History:   Diagnosis Date   • Diabetes mellitus ([facility] [address] )    • Sepsis ([facility] ) 04/03/2021       Past Surgical History:   Procedure Laterality Date   • TONSILLECTOMY         History reviewed  No pertinent family history  I have reviewed and agree with the history as documented  E-Cigarette/Vaping     E-Cigarette/Vaping Substances     Social History     Tobacco Use   • Smoking status: Never   • Smokeless tobacco: Never   Substance Use Topics   • Alcohol use: Never   • Drug use: Never       Review of Systems   Constitutional: Negative for fever  HENT: Negative for congestion  Eyes: Negative for pain and redness  Respiratory: Negative for cough and shortness of breath  Cardiovascular: Negative for chest pain  Gastrointestinal: Negative for abdominal pain and vomiting  Neurological: Negative for headaches  Headache secondary to fall    Physical Exam  Physical Exam  Vitals and nursing note reviewed  Constitutional:       Appearance: She is well-developed  HENT:      Head: Normocephalic  Comments: Tenderness left posterior scalp no laceration no hematoma     Right Ear: External ear normal       Left Ear: External ear normal       Nose: Nose normal    Eyes:      General: Lids are normal       Pupils: Pupils are equal, round, and reactive to light  Neck:      Comments: No midline cervical tenderness cleared by [facility]  Cardiovascular:      Rate and Rhythm: Normal rate and regular rhythm  Pulses: Normal pulses  Heart sounds: Normal heart sounds  Pulmonary:      Effort: Pulmonary effort is normal  No respiratory distress  Abdominal:      General: Abdomen is flat  Tenderness: There is no abdominal tenderness  Musculoskeletal:         General: No deformity  Normal range of motion  Cervical back: Normal range of motion and neck supple  No tenderness     Skin:     General: Skin is warm and dry  Neurological:      Mental Status: She is alert and oriented to person, place, and time  Vital Signs  ED Triage Vitals [05/28/23 0646]   Temperature Pulse Respirations Blood Pressure SpO2   97 6 °F (36 4 °C) 72 19 167/71 99 %      Temp Source Heart Rate Source Patient Position - Orthostatic VS BP Location FiO2 (%)   Oral Monitor Sitting Left arm --      Pain Score       --           Vitals:    05/28/23 0646 05/28/23 0841   BP: 167/71 158/64   Pulse: 72 70   Patient Position - Orthostatic VS: Sitting          Visual Acuity      ED Medications  Medications - No data to display    Diagnostic Studies  Results Reviewed     None                 CT head without contrast   Final Result by Fabienne Forrest MD (05/28 9075)      No acute intracranial abnormality  Workstation performed: [id number]                    Procedures  Procedures         ED Course       Discussed with patient, describes significant head strike concerned about intracranial pathology will perform CT scan      CT scan was normal                      SBIRT 22yo+    Flowsheet Row Most Recent Value   Initial Alcohol Screen: US AUDIT-C     1  How often do you have a drink containing alcohol? 0 Filed at: 05/28/2023 0646   2  How many drinks containing alcohol do you have on a typical day you are drinking? 0 Filed at: 05/28/2023 0646   3b  FEMALE Any Age, or MALE 65+: How often do you have 4 or more drinks on one occassion? 0 Filed at: 05/28/2023 0646   Audit-C Score 0 Filed at: 05/28/2023 8563   OMAR: How many times in the past year have you    Used an illegal drug or used a prescription medication for non-medical reasons? Never Filed at: 05/28/2023 2628                    Medical Decision Making  Medical decision making 79-year-old female presents emergency room after a fall from a stepstool at work, hitting the back of her head, complains of persistent headache and pain  CT showed no acute pathology    Discussed outpatient treatment and follow-up discussed indications to return  Follow through the concussion clinic  Injury of head, initial encounter: acute illness or injury  Amount and/or Complexity of Data Reviewed  Radiology: ordered  Disposition  Final diagnoses:   Injury of head, initial encounter     Time reflects when diagnosis was documented in both MDM as applicable and the Disposition within this note     Time User Action Codes Description Comment    5/28/2023  8:15 AM Laura Merino Add [S09 90XA] Injury of head, initial encounter       ED Disposition     ED Disposition   Discharge    Condition   Stable    Date/Time   Sun May 28, 2023  8:15 AM    Comment   Tyra Eloy discharge to home/self care  Follow-up Information    None         Discharge Medication List as of 5/28/2023  8:16 AM      CONTINUE these medications which have NOT CHANGED    Details   Ascorbic Acid (vitamin C) 1000 MG tablet Take 1 tablet (1,000 mg total) by mouth 2 (two) times a day for 7 days, Starting Mon 3/29/2021, Until Mon 4/5/2021, Print      cholecalciferol (VITAMIN D3) 1,000 units tablet Take 2 tablets (2,000 Units total) by mouth daily for 7 days, Starting Mon 3/29/2021, Until Mon 4/5/2021, Print      dicyclomine (BENTYL) 10 mg capsule Take 1 capsule (10 mg total) by mouth 4 (four) times a day as needed (nausea, vomiting), Starting Mon 4/12/2021, Until Wed 5/12/2021, Normal      ibuprofen (MOTRIN) 600 mg tablet Take 1 tablet (600 mg total) by mouth every 6 (six) hours as needed for moderate pain, Starting Fri 7/5/2019, Print      Misc   Devices (Pulse Oximeter) MISC Please check your oxygen saturation every 4 hours while awake or if you experience shortness of breath, Print      multivitamin-iron-minerals-folic acid (CENTRUM) chewable tablet Chew 1 tablet daily for 7 days, Starting Mon 3/29/2021, Until Mon 4/5/2021, Print      ondansetron (ZOFRAN) 4 mg tablet Take 1 tablet (4 mg total) by mouth every 6 (six) hours, Starting Mon 3/29/2021, Print                 PDMP Review     None          ED Provider  Electronically Signed by           Kallie Brown MD  05/28/23 5394

## 2023-06-09 ENCOUNTER — TELEPHONE (OUTPATIENT)
Dept: URGENT CARE | Facility: MEDICAL CENTER | Age: 63
End: 2023-06-09
